# Patient Record
Sex: FEMALE | Race: WHITE | Employment: OTHER | URBAN - METROPOLITAN AREA
[De-identification: names, ages, dates, MRNs, and addresses within clinical notes are randomized per-mention and may not be internally consistent; named-entity substitution may affect disease eponyms.]

---

## 2017-01-12 ENCOUNTER — OFFICE VISIT (OUTPATIENT)
Dept: GERIATRIC MEDICINE | Age: 82
End: 2017-01-12

## 2017-01-12 DIAGNOSIS — R53.1 WEAKNESS: ICD-10-CM

## 2017-01-12 DIAGNOSIS — K59.00 CONSTIPATION, UNSPECIFIED CONSTIPATION TYPE: ICD-10-CM

## 2017-01-12 DIAGNOSIS — I10 ESSENTIAL HYPERTENSION: Primary | ICD-10-CM

## 2017-01-12 DIAGNOSIS — M15.9 OSTEOARTHRITIS OF MULTIPLE JOINTS, UNSPECIFIED OSTEOARTHRITIS TYPE: ICD-10-CM

## 2017-01-13 ENCOUNTER — OFFICE VISIT (OUTPATIENT)
Dept: FAMILY MEDICINE CLINIC | Age: 82
End: 2017-01-13

## 2017-01-13 VITALS
WEIGHT: 146 LBS | HEART RATE: 72 BPM | TEMPERATURE: 98.1 F | BODY MASS INDEX: 24.32 KG/M2 | RESPIRATION RATE: 14 BRPM | DIASTOLIC BLOOD PRESSURE: 62 MMHG | HEIGHT: 65 IN | SYSTOLIC BLOOD PRESSURE: 100 MMHG

## 2017-01-13 DIAGNOSIS — I87.2 VENOUS INSUFFICIENCY: ICD-10-CM

## 2017-01-13 DIAGNOSIS — I10 ESSENTIAL HYPERTENSION: ICD-10-CM

## 2017-01-13 DIAGNOSIS — Z23 NEED FOR PNEUMOCOCCAL VACCINATION: ICD-10-CM

## 2017-01-13 DIAGNOSIS — D69.6 THROMBOCYTOPENIA (HCC): ICD-10-CM

## 2017-01-13 DIAGNOSIS — M81.0 OSTEOPOROSIS: ICD-10-CM

## 2017-01-13 DIAGNOSIS — I10 ESSENTIAL HYPERTENSION: Primary | ICD-10-CM

## 2017-01-13 DIAGNOSIS — R73.9 HYPERGLYCEMIA: ICD-10-CM

## 2017-01-13 DIAGNOSIS — R32 INCONTINENCE IN FEMALE: ICD-10-CM

## 2017-01-13 DIAGNOSIS — Z23 NEED FOR INFLUENZA VACCINATION: ICD-10-CM

## 2017-01-13 DIAGNOSIS — G62.9 PERIPHERAL POLYNEUROPATHY: ICD-10-CM

## 2017-01-13 DIAGNOSIS — Z91.81 AT HIGH RISK FOR FALLS: ICD-10-CM

## 2017-01-13 LAB
ALBUMIN SERPL-MCNC: 3.7 G/DL (ref 3.9–4.9)
ALP BLD-CCNC: 123 U/L (ref 40–130)
ALT SERPL-CCNC: 12 U/L (ref 0–33)
ANION GAP SERPL CALCULATED.3IONS-SCNC: 16 MEQ/L (ref 7–13)
AST SERPL-CCNC: 20 U/L (ref 0–35)
BILIRUB SERPL-MCNC: 0.5 MG/DL (ref 0–1.2)
BUN BLDV-MCNC: 25 MG/DL (ref 8–23)
CALCIUM SERPL-MCNC: 9.7 MG/DL (ref 8.6–10.2)
CHLORIDE BLD-SCNC: 100 MEQ/L (ref 98–107)
CHOLESTEROL, TOTAL: 146 MG/DL (ref 0–199)
CO2: 22 MEQ/L (ref 22–29)
CREAT SERPL-MCNC: 0.79 MG/DL (ref 0.5–0.9)
GFR AFRICAN AMERICAN: >60
GFR NON-AFRICAN AMERICAN: >60
GLOBULIN: 4.6 G/DL (ref 2.3–3.5)
GLUCOSE BLD-MCNC: 83 MG/DL (ref 74–109)
HBA1C MFR BLD: 5.8 % (ref 4.8–5.9)
HCT VFR BLD CALC: 39.5 % (ref 37–47)
HDLC SERPL-MCNC: 62 MG/DL (ref 40–59)
HEMOGLOBIN: 12.7 G/DL (ref 12–16)
LDL CHOLESTEROL CALCULATED: 71 MG/DL (ref 0–129)
MCH RBC QN AUTO: 30.4 PG (ref 27–31.3)
MCHC RBC AUTO-ENTMCNC: 32.2 % (ref 33–37)
MCV RBC AUTO: 94.3 FL (ref 82–100)
PDW BLD-RTO: 17.4 % (ref 11.5–14.5)
PLATELET # BLD: 136 K/UL (ref 130–400)
POTASSIUM SERPL-SCNC: 4.7 MEQ/L (ref 3.5–5.1)
RBC # BLD: 4.19 M/UL (ref 4.2–5.4)
SEDIMENTATION RATE, ERYTHROCYTE: 51 MM (ref 0–30)
SODIUM BLD-SCNC: 138 MEQ/L (ref 132–144)
T4 FREE: 1.17 NG/DL (ref 0.93–1.7)
TOTAL PROTEIN: 8.3 G/DL (ref 6.4–8.1)
TRIGL SERPL-MCNC: 65 MG/DL (ref 0–200)
TSH SERPL DL<=0.05 MIU/L-ACNC: 4.11 UIU/ML (ref 0.27–4.2)
WBC # BLD: 5.5 K/UL (ref 4.8–10.8)

## 2017-01-13 PROCEDURE — G0009 ADMIN PNEUMOCOCCAL VACCINE: HCPCS | Performed by: FAMILY MEDICINE

## 2017-01-13 PROCEDURE — 90662 IIV NO PRSV INCREASED AG IM: CPT | Performed by: FAMILY MEDICINE

## 2017-01-13 PROCEDURE — 99203 OFFICE O/P NEW LOW 30 MIN: CPT | Performed by: FAMILY MEDICINE

## 2017-01-13 PROCEDURE — 90670 PCV13 VACCINE IM: CPT | Performed by: FAMILY MEDICINE

## 2017-01-13 PROCEDURE — G0008 ADMIN INFLUENZA VIRUS VAC: HCPCS | Performed by: FAMILY MEDICINE

## 2017-01-13 RX ORDER — POLYETHYLENE GLYCOL 3350 17 G/17G
POWDER, FOR SOLUTION ORAL
COMMUNITY
Start: 2016-12-22 | End: 2017-04-14 | Stop reason: SDUPTHER

## 2017-01-13 RX ORDER — DOXYCYCLINE 100 MG/1
TABLET ORAL
COMMUNITY
Start: 2016-11-28 | End: 2017-03-02 | Stop reason: SDUPTHER

## 2017-01-13 RX ORDER — TRAMADOL HYDROCHLORIDE 50 MG/1
50 TABLET ORAL EVERY 8 HOURS PRN
COMMUNITY
End: 2017-07-14

## 2017-01-13 ASSESSMENT — PATIENT HEALTH QUESTIONNAIRE - PHQ9
1. LITTLE INTEREST OR PLEASURE IN DOING THINGS: 0
SUM OF ALL RESPONSES TO PHQ QUESTIONS 1-9: 0
2. FEELING DOWN, DEPRESSED OR HOPELESS: 0
SUM OF ALL RESPONSES TO PHQ9 QUESTIONS 1 & 2: 0

## 2017-01-16 VITALS — SYSTOLIC BLOOD PRESSURE: 111 MMHG | HEART RATE: 65 BPM | DIASTOLIC BLOOD PRESSURE: 62 MMHG

## 2017-01-30 ENCOUNTER — NURSE ONLY (OUTPATIENT)
Dept: FAMILY MEDICINE CLINIC | Age: 82
End: 2017-01-30

## 2017-01-30 ENCOUNTER — OFFICE VISIT (OUTPATIENT)
Dept: INFECTIOUS DISEASES | Age: 82
End: 2017-01-30

## 2017-01-30 VITALS
BODY MASS INDEX: 24.19 KG/M2 | RESPIRATION RATE: 16 BRPM | DIASTOLIC BLOOD PRESSURE: 81 MMHG | HEIGHT: 65 IN | TEMPERATURE: 97.5 F | HEART RATE: 75 BPM | SYSTOLIC BLOOD PRESSURE: 143 MMHG | WEIGHT: 145.2 LBS

## 2017-01-30 DIAGNOSIS — T84.59XD INFECTED PROSTHETIC KNEE JOINT, SUBSEQUENT ENCOUNTER: Primary | ICD-10-CM

## 2017-01-30 DIAGNOSIS — L08.9 RIGHT KNEE SKIN INFECTION: ICD-10-CM

## 2017-01-30 DIAGNOSIS — R32 INCONTINENCE: Primary | ICD-10-CM

## 2017-01-30 DIAGNOSIS — R32 INCONTINENCE: ICD-10-CM

## 2017-01-30 DIAGNOSIS — Z96.659 INFECTED PROSTHETIC KNEE JOINT, SUBSEQUENT ENCOUNTER: Primary | ICD-10-CM

## 2017-01-30 LAB
BACTERIA: ABNORMAL /HPF
BILIRUBIN URINE: NEGATIVE
BLOOD, URINE: ABNORMAL
CLARITY: ABNORMAL
COLOR: YELLOW
EPITHELIAL CELLS, UA: ABNORMAL /HPF
GLUCOSE URINE: NEGATIVE MG/DL
KETONES, URINE: NEGATIVE MG/DL
LEUKOCYTE ESTERASE, URINE: ABNORMAL
NITRITE, URINE: POSITIVE
PH UA: 6 (ref 5–9)
PROTEIN UA: NEGATIVE MG/DL
RBC UA: ABNORMAL /HPF (ref 0–2)
SPECIFIC GRAVITY UA: 1.02 (ref 1–1.03)
UROBILINOGEN, URINE: 0.2 E.U./DL
WBC UA: ABNORMAL /HPF (ref 0–5)

## 2017-01-30 PROCEDURE — 99213 OFFICE O/P EST LOW 20 MIN: CPT | Performed by: INTERNAL MEDICINE

## 2017-01-30 RX ORDER — DOXYCYCLINE HYCLATE 100 MG
100 TABLET ORAL DAILY
Qty: 30 TABLET | Refills: 3 | Status: SHIPPED | OUTPATIENT
Start: 2017-01-30 | End: 2017-03-01

## 2017-01-30 RX ORDER — DOXYCYCLINE HYCLATE 100 MG
100 TABLET ORAL DAILY
Qty: 30 TABLET | Refills: 3 | Status: SHIPPED | OUTPATIENT
Start: 2017-01-30 | End: 2017-01-30

## 2017-01-30 ASSESSMENT — ENCOUNTER SYMPTOMS
VOMITING: 0
EYE PAIN: 0
DIARRHEA: 0
COUGH: 0

## 2017-02-02 ENCOUNTER — TELEPHONE (OUTPATIENT)
Dept: FAMILY MEDICINE CLINIC | Age: 82
End: 2017-02-02

## 2017-02-02 LAB
ORGANISM: ABNORMAL
URINE CULTURE, ROUTINE: ABNORMAL

## 2017-02-03 RX ORDER — CIPROFLOXACIN 500 MG/1
500 TABLET, FILM COATED ORAL 2 TIMES DAILY
Qty: 14 TABLET | Refills: 0 | Status: SHIPPED | OUTPATIENT
Start: 2017-02-03 | End: 2017-02-10

## 2017-03-02 ENCOUNTER — OFFICE VISIT (OUTPATIENT)
Dept: INFECTIOUS DISEASES | Age: 82
End: 2017-03-02

## 2017-03-02 ENCOUNTER — NURSE ONLY (OUTPATIENT)
Dept: FAMILY MEDICINE CLINIC | Age: 82
End: 2017-03-02

## 2017-03-02 VITALS
RESPIRATION RATE: 18 BRPM | SYSTOLIC BLOOD PRESSURE: 126 MMHG | HEIGHT: 65 IN | HEART RATE: 65 BPM | DIASTOLIC BLOOD PRESSURE: 61 MMHG | TEMPERATURE: 97.8 F

## 2017-03-02 DIAGNOSIS — N39.0 URINARY TRACT INFECTION WITH HEMATURIA, SITE UNSPECIFIED: Primary | ICD-10-CM

## 2017-03-02 DIAGNOSIS — R31.9 URINARY TRACT INFECTION WITH HEMATURIA, SITE UNSPECIFIED: ICD-10-CM

## 2017-03-02 DIAGNOSIS — N39.0 URINARY TRACT INFECTION WITH HEMATURIA, SITE UNSPECIFIED: ICD-10-CM

## 2017-03-02 DIAGNOSIS — L03.90 CELLULITIS, UNSPECIFIED CELLULITIS SITE: Primary | ICD-10-CM

## 2017-03-02 DIAGNOSIS — R31.9 URINARY TRACT INFECTION WITH HEMATURIA, SITE UNSPECIFIED: Primary | ICD-10-CM

## 2017-03-02 PROCEDURE — 99213 OFFICE O/P EST LOW 20 MIN: CPT | Performed by: INTERNAL MEDICINE

## 2017-03-02 RX ORDER — DOXYCYCLINE 100 MG/1
100 TABLET ORAL DAILY
Qty: 30 TABLET | Refills: 3 | Status: SHIPPED | OUTPATIENT
Start: 2017-03-02 | End: 2017-03-27 | Stop reason: SDUPTHER

## 2017-03-02 ASSESSMENT — ENCOUNTER SYMPTOMS
VOMITING: 0
DIARRHEA: 0
COUGH: 0
EYE PAIN: 0

## 2017-03-04 LAB — URINE CULTURE, ROUTINE: NORMAL

## 2017-03-27 RX ORDER — DOCUSATE SODIUM 100 MG/1
100 CAPSULE, LIQUID FILLED ORAL DAILY
Qty: 9 CAPSULE | Refills: 3 | Status: SHIPPED | OUTPATIENT
Start: 2017-03-27 | End: 2017-10-27 | Stop reason: SDUPTHER

## 2017-03-27 RX ORDER — GREEN TEA/HOODIA GORDONII 315-12.5MG
1 CAPSULE ORAL 3 TIMES DAILY
Qty: 60 TABLET | Refills: 1 | Status: SHIPPED | OUTPATIENT
Start: 2017-03-27 | End: 2017-04-14 | Stop reason: SDUPTHER

## 2017-03-27 RX ORDER — SPIRONOLACTONE 25 MG/1
25 TABLET ORAL DAILY
Qty: 30 TABLET | Refills: 5 | Status: SHIPPED | OUTPATIENT
Start: 2017-03-27 | End: 2017-04-14 | Stop reason: SDUPTHER

## 2017-03-27 RX ORDER — DOXYCYCLINE 100 MG/1
100 TABLET ORAL DAILY
Qty: 30 TABLET | Refills: 3 | Status: SHIPPED | OUTPATIENT
Start: 2017-03-27 | End: 2017-04-14 | Stop reason: SDUPTHER

## 2017-03-27 RX ORDER — CHOLECALCIFEROL (VITAMIN D3) 125 MCG
1 CAPSULE ORAL NIGHTLY
Qty: 90 TABLET | Refills: 3 | Status: SHIPPED | OUTPATIENT
Start: 2017-03-27 | End: 2017-10-27 | Stop reason: SDUPTHER

## 2017-04-14 RX ORDER — GREEN TEA/HOODIA GORDONII 315-12.5MG
1 CAPSULE ORAL 3 TIMES DAILY
Qty: 60 TABLET | Refills: 1 | Status: SHIPPED | OUTPATIENT
Start: 2017-04-14 | End: 2017-04-26 | Stop reason: SDUPTHER

## 2017-04-14 RX ORDER — SPIRONOLACTONE 25 MG/1
25 TABLET ORAL DAILY
Qty: 30 TABLET | Refills: 5 | Status: SHIPPED | OUTPATIENT
Start: 2017-04-14 | End: 2017-04-26 | Stop reason: SDUPTHER

## 2017-04-14 RX ORDER — DOXYCYCLINE 100 MG/1
100 TABLET ORAL DAILY
Qty: 30 TABLET | Refills: 3 | Status: SHIPPED | OUTPATIENT
Start: 2017-04-14 | End: 2017-04-26 | Stop reason: SDUPTHER

## 2017-04-17 RX ORDER — POLYETHYLENE GLYCOL 3350 17 G/17G
17 POWDER, FOR SOLUTION ORAL DAILY
Qty: 1 BOTTLE | Refills: 1 | Status: SHIPPED | OUTPATIENT
Start: 2017-04-17 | End: 2017-10-27 | Stop reason: SDUPTHER

## 2017-04-28 RX ORDER — SPIRONOLACTONE 25 MG/1
25 TABLET ORAL DAILY
Qty: 30 TABLET | Refills: 0 | Status: SHIPPED | OUTPATIENT
Start: 2017-04-28 | End: 2017-08-15 | Stop reason: SDUPTHER

## 2017-04-28 RX ORDER — GREEN TEA/HOODIA GORDONII 315-12.5MG
1 CAPSULE ORAL 3 TIMES DAILY
Qty: 90 TABLET | Refills: 0 | Status: SHIPPED | OUTPATIENT
Start: 2017-04-28 | End: 2017-10-27 | Stop reason: SDUPTHER

## 2017-04-28 RX ORDER — DOXYCYCLINE 100 MG/1
100 TABLET ORAL DAILY
Qty: 30 TABLET | Refills: 0 | Status: SHIPPED | OUTPATIENT
Start: 2017-04-28 | End: 2017-08-15 | Stop reason: SDUPTHER

## 2017-05-16 ENCOUNTER — OFFICE VISIT (OUTPATIENT)
Dept: INFECTIOUS DISEASES | Age: 82
End: 2017-05-16

## 2017-05-16 VITALS
HEART RATE: 62 BPM | RESPIRATION RATE: 14 BRPM | TEMPERATURE: 98.5 F | BODY MASS INDEX: 24.59 KG/M2 | WEIGHT: 147.6 LBS | SYSTOLIC BLOOD PRESSURE: 143 MMHG | HEIGHT: 65 IN | DIASTOLIC BLOOD PRESSURE: 59 MMHG

## 2017-05-16 DIAGNOSIS — T84.59XD INFECTED PROSTHETIC KNEE JOINT, SUBSEQUENT ENCOUNTER: Primary | ICD-10-CM

## 2017-05-16 DIAGNOSIS — Z96.659 INFECTED PROSTHETIC KNEE JOINT, SUBSEQUENT ENCOUNTER: Primary | ICD-10-CM

## 2017-05-16 PROCEDURE — 99212 OFFICE O/P EST SF 10 MIN: CPT | Performed by: INTERNAL MEDICINE

## 2017-05-16 RX ORDER — DOXYCYCLINE HYCLATE 100 MG
100 TABLET ORAL 2 TIMES DAILY
Qty: 180 TABLET | Refills: 2 | Status: SHIPPED | OUTPATIENT
Start: 2017-05-16 | End: 2017-07-14

## 2017-05-16 ASSESSMENT — ENCOUNTER SYMPTOMS: RESPIRATORY NEGATIVE: 1

## 2017-07-14 ENCOUNTER — OFFICE VISIT (OUTPATIENT)
Dept: FAMILY MEDICINE CLINIC | Age: 82
End: 2017-07-14

## 2017-07-14 VITALS
SYSTOLIC BLOOD PRESSURE: 114 MMHG | TEMPERATURE: 98.6 F | HEART RATE: 74 BPM | WEIGHT: 147 LBS | DIASTOLIC BLOOD PRESSURE: 68 MMHG | RESPIRATION RATE: 14 BRPM | HEIGHT: 65 IN | BODY MASS INDEX: 24.49 KG/M2

## 2017-07-14 DIAGNOSIS — I10 ESSENTIAL HYPERTENSION: Primary | ICD-10-CM

## 2017-07-14 DIAGNOSIS — R53.1 WEAKNESS: ICD-10-CM

## 2017-07-14 DIAGNOSIS — D69.6 THROMBOCYTOPENIA (HCC): ICD-10-CM

## 2017-07-14 DIAGNOSIS — M81.0 OSTEOPOROSIS: ICD-10-CM

## 2017-07-14 DIAGNOSIS — I10 ESSENTIAL HYPERTENSION: ICD-10-CM

## 2017-07-14 DIAGNOSIS — G62.9 PERIPHERAL POLYNEUROPATHY: ICD-10-CM

## 2017-07-14 LAB
ANION GAP SERPL CALCULATED.3IONS-SCNC: 12 MEQ/L (ref 7–13)
BUN BLDV-MCNC: 23 MG/DL (ref 8–23)
CALCIUM SERPL-MCNC: 9.1 MG/DL (ref 8.6–10.2)
CHLORIDE BLD-SCNC: 104 MEQ/L (ref 98–107)
CO2: 23 MEQ/L (ref 22–29)
CREAT SERPL-MCNC: 0.62 MG/DL (ref 0.5–0.9)
GFR AFRICAN AMERICAN: >60
GFR NON-AFRICAN AMERICAN: >60
GLUCOSE BLD-MCNC: 99 MG/DL (ref 74–109)
HCT VFR BLD CALC: 39.2 % (ref 37–47)
HEMOGLOBIN: 12.9 G/DL (ref 12–16)
MCH RBC QN AUTO: 32.4 PG (ref 27–31.3)
MCHC RBC AUTO-ENTMCNC: 32.9 % (ref 33–37)
MCV RBC AUTO: 98.3 FL (ref 82–100)
PDW BLD-RTO: 13.5 % (ref 11.5–14.5)
PLATELET # BLD: 109 K/UL (ref 130–400)
POTASSIUM SERPL-SCNC: 4.7 MEQ/L (ref 3.5–5.1)
RBC # BLD: 3.99 M/UL (ref 4.2–5.4)
SODIUM BLD-SCNC: 139 MEQ/L (ref 132–144)
WBC # BLD: 3.5 K/UL (ref 4.8–10.8)

## 2017-07-14 PROCEDURE — 99214 OFFICE O/P EST MOD 30 MIN: CPT | Performed by: FAMILY MEDICINE

## 2017-07-17 ENCOUNTER — TELEPHONE (OUTPATIENT)
Dept: FAMILY MEDICINE CLINIC | Age: 82
End: 2017-07-17

## 2017-07-17 DIAGNOSIS — R27.0 ATAXIA: ICD-10-CM

## 2017-07-17 DIAGNOSIS — M47.26 OSTEOARTHRITIS OF SPINE WITH RADICULOPATHY, LUMBAR REGION: Primary | ICD-10-CM

## 2017-08-15 RX ORDER — SPIRONOLACTONE 25 MG/1
25 TABLET ORAL DAILY
Qty: 90 TABLET | Refills: 1 | Status: SHIPPED | OUTPATIENT
Start: 2017-08-15 | End: 2017-10-27 | Stop reason: SDUPTHER

## 2017-08-15 RX ORDER — DOXYCYCLINE 100 MG/1
100 TABLET ORAL DAILY
Qty: 90 TABLET | Refills: 1 | Status: SHIPPED | OUTPATIENT
Start: 2017-08-15 | End: 2017-10-27 | Stop reason: SDUPTHER

## 2017-10-22 ENCOUNTER — APPOINTMENT (OUTPATIENT)
Dept: GENERAL RADIOLOGY | Age: 82
DRG: 481 | End: 2017-10-22
Payer: MEDICARE

## 2017-10-22 ENCOUNTER — HOSPITAL ENCOUNTER (INPATIENT)
Age: 82
LOS: 4 days | Discharge: NURSING, REHAB OR LONG-TERM CARE | DRG: 481 | End: 2017-10-26
Attending: ORTHOPAEDIC SURGERY | Admitting: ORTHOPAEDIC SURGERY
Payer: MEDICARE

## 2017-10-22 ENCOUNTER — APPOINTMENT (OUTPATIENT)
Dept: CT IMAGING | Age: 82
DRG: 481 | End: 2017-10-22
Payer: MEDICARE

## 2017-10-22 DIAGNOSIS — S61.212A LACERATION OF RIGHT MIDDLE FINGER WITHOUT DAMAGE TO NAIL, FOREIGN BODY PRESENCE UNSPECIFIED, INITIAL ENCOUNTER: ICD-10-CM

## 2017-10-22 DIAGNOSIS — S72.001A CLOSED FRACTURE OF RIGHT HIP, INITIAL ENCOUNTER (HCC): Primary | ICD-10-CM
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
## 2017-10-22 LAB
ALBUMIN SERPL-MCNC: 3.7 G/DL (ref 3.9–4.9)
ALP BLD-CCNC: 106 U/L (ref 40–130)
ALT SERPL-CCNC: 14 U/L (ref 0–33)
ANION GAP SERPL CALCULATED.3IONS-SCNC: 14 MEQ/L (ref 7–13)
AST SERPL-CCNC: 23 U/L (ref 0–35)
BACTERIA: ABNORMAL /HPF
BASOPHILS ABSOLUTE: 0 K/UL (ref 0–0.2)
BASOPHILS RELATIVE PERCENT: 0.7 %
BILIRUB SERPL-MCNC: 0.6 MG/DL (ref 0–1.2)
BILIRUBIN URINE: NEGATIVE
BLOOD, URINE: ABNORMAL
BUN BLDV-MCNC: 26 MG/DL (ref 8–23)
CALCIUM SERPL-MCNC: 9.6 MG/DL (ref 8.6–10.2)
CHLORIDE BLD-SCNC: 101 MEQ/L (ref 98–107)
CLARITY: CLEAR
CO2: 24 MEQ/L (ref 22–29)
COLOR: YELLOW
CREAT SERPL-MCNC: 0.67 MG/DL (ref 0.5–0.9)
EOSINOPHILS ABSOLUTE: 0.1 K/UL (ref 0–0.7)
EOSINOPHILS RELATIVE PERCENT: 1.3 %
EPITHELIAL CELLS, UA: ABNORMAL /HPF
GFR AFRICAN AMERICAN: >60
GFR NON-AFRICAN AMERICAN: >60
GLOBULIN: 4 G/DL (ref 2.3–3.5)
GLUCOSE BLD-MCNC: 112 MG/DL (ref 74–109)
GLUCOSE URINE: NEGATIVE MG/DL
HCT VFR BLD CALC: 38.9 % (ref 37–47)
HEMOGLOBIN: 13 G/DL (ref 12–16)
KETONES, URINE: NEGATIVE MG/DL
LEUKOCYTE ESTERASE, URINE: NEGATIVE
LYMPHOCYTES ABSOLUTE: 2.1 K/UL (ref 1–4.8)
LYMPHOCYTES RELATIVE PERCENT: 42.2 %
MCH RBC QN AUTO: 32.7 PG (ref 27–31.3)
MCHC RBC AUTO-ENTMCNC: 33.5 % (ref 33–37)
MCV RBC AUTO: 97.7 FL (ref 82–100)
MONOCYTES ABSOLUTE: 0.5 K/UL (ref 0.2–0.8)
MONOCYTES RELATIVE PERCENT: 11.1 %
NEUTROPHILS ABSOLUTE: 2.2 K/UL (ref 1.4–6.5)
NEUTROPHILS RELATIVE PERCENT: 44.7 %
NITRITE, URINE: NEGATIVE
PDW BLD-RTO: 13.3 % (ref 11.5–14.5)
PH UA: 6 (ref 5–9)
PLATELET # BLD: 92 K/UL (ref 130–400)
PLATELET SLIDE REVIEW: ABNORMAL
POTASSIUM SERPL-SCNC: 4.3 MEQ/L (ref 3.5–5.1)
PROTEIN UA: NEGATIVE MG/DL
RBC # BLD: 3.98 M/UL (ref 4.2–5.4)
RBC UA: ABNORMAL /HPF (ref 0–2)
SODIUM BLD-SCNC: 139 MEQ/L (ref 132–144)
SPECIFIC GRAVITY UA: 1.01 (ref 1–1.03)
TOTAL PROTEIN: 7.7 G/DL (ref 6.4–8.1)
URINE REFLEX TO CULTURE: YES
UROBILINOGEN, URINE: 0.2 E.U./DL
WBC # BLD: 4.9 K/UL (ref 4.8–10.8)
WBC UA: ABNORMAL /HPF (ref 0–5)

## 2017-10-22 PROCEDURE — 99285 EMERGENCY DEPT VISIT HI MDM: CPT

## 2017-10-22 PROCEDURE — 72125 CT NECK SPINE W/O DYE: CPT

## 2017-10-22 PROCEDURE — 1210000000 HC MED SURG R&B

## 2017-10-22 PROCEDURE — 96374 THER/PROPH/DIAG INJ IV PUSH: CPT

## 2017-10-22 PROCEDURE — 6360000002 HC RX W HCPCS: Performed by: PHYSICIAN ASSISTANT

## 2017-10-22 PROCEDURE — 73552 X-RAY EXAM OF FEMUR 2/>: CPT

## 2017-10-22 PROCEDURE — 85025 COMPLETE CBC W/AUTO DIFF WBC: CPT

## 2017-10-22 PROCEDURE — 96375 TX/PRO/DX INJ NEW DRUG ADDON: CPT

## 2017-10-22 PROCEDURE — 87086 URINE CULTURE/COLONY COUNT: CPT

## 2017-10-22 PROCEDURE — 6370000000 HC RX 637 (ALT 250 FOR IP): Performed by: ORTHOPAEDIC SURGERY

## 2017-10-22 PROCEDURE — 99222 1ST HOSP IP/OBS MODERATE 55: CPT | Performed by: INTERNAL MEDICINE

## 2017-10-22 PROCEDURE — 71010 XR CHEST LIMITED: CPT

## 2017-10-22 PROCEDURE — 73502 X-RAY EXAM HIP UNI 2-3 VIEWS: CPT

## 2017-10-22 PROCEDURE — 93005 ELECTROCARDIOGRAM TRACING: CPT

## 2017-10-22 PROCEDURE — 70450 CT HEAD/BRAIN W/O DYE: CPT

## 2017-10-22 PROCEDURE — 6360000002 HC RX W HCPCS: Performed by: ORTHOPAEDIC SURGERY

## 2017-10-22 PROCEDURE — 80053 COMPREHEN METABOLIC PANEL: CPT

## 2017-10-22 PROCEDURE — 6370000000 HC RX 637 (ALT 250 FOR IP): Performed by: PHYSICIAN ASSISTANT

## 2017-10-22 PROCEDURE — 71010 XR CHEST PORTABLE: CPT

## 2017-10-22 PROCEDURE — 2500000003 HC RX 250 WO HCPCS: Performed by: PHYSICIAN ASSISTANT

## 2017-10-22 PROCEDURE — 36415 COLL VENOUS BLD VENIPUNCTURE: CPT

## 2017-10-22 PROCEDURE — 0HQFXZZ REPAIR RIGHT HAND SKIN, EXTERNAL APPROACH: ICD-10-PCS | Performed by: PHYSICIAN ASSISTANT

## 2017-10-22 PROCEDURE — 81001 URINALYSIS AUTO W/SCOPE: CPT

## 2017-10-22 RX ORDER — ONDANSETRON 2 MG/ML
4 INJECTION INTRAMUSCULAR; INTRAVENOUS EVERY 6 HOURS PRN
Status: DISCONTINUED | OUTPATIENT
Start: 2017-10-22 | End: 2017-10-24

## 2017-10-22 RX ORDER — DOCUSATE SODIUM 100 MG/1
100 CAPSULE, LIQUID FILLED ORAL DAILY
Status: DISCONTINUED | OUTPATIENT
Start: 2017-10-22 | End: 2017-10-26 | Stop reason: HOSPADM

## 2017-10-22 RX ORDER — L. ACIDOPHILUS/L.BULGARICUS 1MM CELL
1 TABLET ORAL 3 TIMES DAILY
Status: DISCONTINUED | OUTPATIENT
Start: 2017-10-22 | End: 2017-10-26 | Stop reason: HOSPADM

## 2017-10-22 RX ORDER — MORPHINE SULFATE 4 MG/ML
4 INJECTION, SOLUTION INTRAMUSCULAR; INTRAVENOUS
Status: DISCONTINUED | OUTPATIENT
Start: 2017-10-22 | End: 2017-10-24

## 2017-10-22 RX ORDER — SODIUM CHLORIDE 0.9 % (FLUSH) 0.9 %
3 SYRINGE (ML) INJECTION EVERY 8 HOURS
Status: DISCONTINUED | OUTPATIENT
Start: 2017-10-22 | End: 2017-10-26 | Stop reason: HOSPADM

## 2017-10-22 RX ORDER — MORPHINE SULFATE 2 MG/ML
2 INJECTION, SOLUTION INTRAMUSCULAR; INTRAVENOUS ONCE
Status: COMPLETED | OUTPATIENT
Start: 2017-10-22 | End: 2017-10-22

## 2017-10-22 RX ORDER — ONDANSETRON 2 MG/ML
4 INJECTION INTRAMUSCULAR; INTRAVENOUS ONCE
Status: COMPLETED | OUTPATIENT
Start: 2017-10-22 | End: 2017-10-22

## 2017-10-22 RX ORDER — SPIRONOLACTONE 25 MG/1
25 TABLET ORAL DAILY
Status: DISCONTINUED | OUTPATIENT
Start: 2017-10-22 | End: 2017-10-26 | Stop reason: HOSPADM

## 2017-10-22 RX ORDER — CYCLOBENZAPRINE HCL 10 MG
5 TABLET ORAL 2 TIMES DAILY PRN
Status: DISCONTINUED | OUTPATIENT
Start: 2017-10-22 | End: 2017-10-26 | Stop reason: HOSPADM

## 2017-10-22 RX ORDER — DOXYCYCLINE HYCLATE 100 MG/1
100 CAPSULE ORAL DAILY
Status: DISCONTINUED | OUTPATIENT
Start: 2017-10-22 | End: 2017-10-26 | Stop reason: HOSPADM

## 2017-10-22 RX ORDER — OXYCODONE HYDROCHLORIDE AND ACETAMINOPHEN 5; 325 MG/1; MG/1
2 TABLET ORAL EVERY 4 HOURS PRN
Status: DISCONTINUED | OUTPATIENT
Start: 2017-10-22 | End: 2017-10-24

## 2017-10-22 RX ORDER — DOXYCYCLINE 100 MG/1
100 TABLET ORAL DAILY
Status: DISCONTINUED | OUTPATIENT
Start: 2017-10-22 | End: 2017-10-22

## 2017-10-22 RX ORDER — LORAZEPAM 2 MG/ML
0.5 INJECTION INTRAMUSCULAR ONCE
Status: COMPLETED | OUTPATIENT
Start: 2017-10-22 | End: 2017-10-22

## 2017-10-22 RX ORDER — LIDOCAINE HYDROCHLORIDE 10 MG/ML
5 INJECTION, SOLUTION EPIDURAL; INFILTRATION; INTRACAUDAL; PERINEURAL ONCE
Status: COMPLETED | OUTPATIENT
Start: 2017-10-22 | End: 2017-10-22

## 2017-10-22 RX ORDER — UREA 10 %
5 LOTION (ML) TOPICAL NIGHTLY
Status: DISCONTINUED | OUTPATIENT
Start: 2017-10-22 | End: 2017-10-26 | Stop reason: HOSPADM

## 2017-10-22 RX ORDER — OXYCODONE HYDROCHLORIDE AND ACETAMINOPHEN 5; 325 MG/1; MG/1
1 TABLET ORAL EVERY 4 HOURS PRN
Status: DISCONTINUED | OUTPATIENT
Start: 2017-10-22 | End: 2017-10-24

## 2017-10-22 RX ORDER — MORPHINE SULFATE 2 MG/ML
2 INJECTION, SOLUTION INTRAMUSCULAR; INTRAVENOUS
Status: DISCONTINUED | OUTPATIENT
Start: 2017-10-22 | End: 2017-10-24

## 2017-10-22 RX ORDER — POLYETHYLENE GLYCOL 3350 17 G/17G
17 POWDER, FOR SOLUTION ORAL DAILY
Status: DISCONTINUED | OUTPATIENT
Start: 2017-10-22 | End: 2017-10-26 | Stop reason: HOSPADM

## 2017-10-22 RX ORDER — GINSENG 100 MG
CAPSULE ORAL ONCE
Status: COMPLETED | OUTPATIENT
Start: 2017-10-22 | End: 2017-10-22

## 2017-10-22 RX ADMIN — CYCLOBENZAPRINE HYDROCHLORIDE 5 MG: 10 TABLET, FILM COATED ORAL at 11:27

## 2017-10-22 RX ADMIN — OXYCODONE HYDROCHLORIDE AND ACETAMINOPHEN 2 TABLET: 5; 325 TABLET ORAL at 20:53

## 2017-10-22 RX ADMIN — ONDANSETRON 4 MG: 2 INJECTION INTRAMUSCULAR; INTRAVENOUS at 04:49

## 2017-10-22 RX ADMIN — Medication 5 MG: at 20:53

## 2017-10-22 RX ADMIN — BACITRACIN: 500 OINTMENT TOPICAL at 05:51

## 2017-10-22 RX ADMIN — DOXYCYCLINE HYCLATE 100 MG: 100 CAPSULE, GELATIN COATED ORAL at 20:53

## 2017-10-22 RX ADMIN — DOCUSATE SODIUM 100 MG: 100 CAPSULE, LIQUID FILLED ORAL at 20:53

## 2017-10-22 RX ADMIN — Medication 2 MG: at 04:49

## 2017-10-22 RX ADMIN — POLYETHYLENE GLYCOL 3350 17 G: 17 POWDER, FOR SOLUTION ORAL at 20:52

## 2017-10-22 RX ADMIN — LORAZEPAM 0.5 MG: 2 INJECTION INTRAMUSCULAR; INTRAVENOUS at 05:51

## 2017-10-22 RX ADMIN — MORPHINE SULFATE 2 MG: 2 INJECTION, SOLUTION INTRAMUSCULAR; INTRAVENOUS at 15:09

## 2017-10-22 RX ADMIN — LACTOBACILLUS TAB 1 TABLET: TAB at 20:53

## 2017-10-22 RX ADMIN — LIDOCAINE HYDROCHLORIDE 5 ML: 10 INJECTION, SOLUTION EPIDURAL; INFILTRATION; INTRACAUDAL; PERINEURAL at 04:26

## 2017-10-22 RX ADMIN — SPIRONOLACTONE 25 MG: 25 TABLET, FILM COATED ORAL at 20:53

## 2017-10-22 ASSESSMENT — PAIN SCALES - GENERAL
PAINLEVEL_OUTOF10: 8
PAINLEVEL_OUTOF10: 5
PAINLEVEL_OUTOF10: 7
PAINLEVEL_OUTOF10: 6

## 2017-10-22 ASSESSMENT — ENCOUNTER SYMPTOMS
ANAL BLEEDING: 0
PHOTOPHOBIA: 0
ABDOMINAL DISTENTION: 0
COUGH: 0
BACK PAIN: 0
SHORTNESS OF BREATH: 0
VOICE CHANGE: 0
APNEA: 0
EYE DISCHARGE: 0

## 2017-10-22 ASSESSMENT — PAIN DESCRIPTION - PAIN TYPE: TYPE: ACUTE PAIN

## 2017-10-22 ASSESSMENT — PAIN DESCRIPTION - ORIENTATION: ORIENTATION: RIGHT

## 2017-10-22 ASSESSMENT — PAIN DESCRIPTION - LOCATION: LOCATION: HIP;KNEE

## 2017-10-22 ASSESSMENT — PAIN DESCRIPTION - DESCRIPTORS: DESCRIPTORS: DISCOMFORT

## 2017-10-22 NOTE — CARE COORDINATION
I met w pt in the room. She is from an independent apt in [place] and has only been there for a month. Prior to that she was at [facility], and prior to that she was at [facility] for 6 weeks for IV abx for an infection in her rt knee. She wants to return to [facility] for her rehab upon dc. Pt is being cleared for surgery tomorrow. C3 will follow.

## 2017-10-22 NOTE — ED PROVIDER NOTES
bruise/bleed easily. Psychiatric/Behavioral: Negative for behavioral problems, self-injury and sleep disturbance. All other systems reviewed and are negative. Except as noted above the remainder of the review of systems was reviewed and negative. PAST MEDICAL HISTORY     Past Medical History:   Diagnosis Date    Back pain     Constipation     Diverticulosis     DVT (deep venous thrombosis) (HCC)     H/O echocardiogram     EF normal    Hearing loss     Heart block AV third degree (HCC)     HTN (hypertension)     Osteoporosis     Pacemaker     Peripheral neuropathy ([address]     Pulmonary hypertension     Pulmonary nodule, left     Thrombocytopenia (HCC)     Tricuspid regurgitation     Tubular adenoma 2010         SURGICAL HISTORY       Past Surgical History:   Procedure Laterality Date    COLONOSCOPY  1990s    declines further-hx adenoma    HYSTERECTOMY  1965    NO CANCER    SHOULDER ARTHROPLASTY  1990s    right     TOTAL KNEE ARTHROPLASTY  2008    right         CURRENT MEDICATIONS       Previous Medications    DOCUSATE SODIUM (COLACE) 100 MG CAPSULE    Take 1 capsule by mouth daily    DOXYCYCLINE MONOHYDRATE (ADOXA) 100 MG TABLET    Take 1 tablet by mouth daily    LACTOBACILLUS (PROBIOTIC ACIDOPHILUS) TABS    Take 1 tablet by mouth 3 times daily    MAGNESIUM HYDROXIDE (MILK OF MAGNESIA PO)    Take by mouth    MELATONIN 5 MG TABS TABLET    Take 1 tablet by mouth nightly    NAPROXEN SOD-DIPHENHYDRAMINE (ALEVE PM PO)    Take by mouth    POLYETHYLENE GLYCOL (GLYCOLAX) POWDER    Take 17 g by mouth daily    SPIRONOLACTONE (ALDACTONE) 25 MG TABLET    Take 1 tablet by mouth daily       ALLERGIES     Bactrim [sulfamethoxazole-trimethoprim]    FAMILY HISTORY       Family History   Problem Relation Age of Onset    Cancer Mother     Other Father      heart attack.           SOCIAL HISTORY       Social History     Social History    Marital status:      Spouse name: N/A    Number of children: N/A    Years of education: N/A     Social History Main Topics    Smoking status: Former Smoker     Types: Cigarettes    Smokeless tobacco: Never Used    Alcohol use No    Drug use: No    Sexual activity: No     Other Topics Concern    None     Social History Narrative    None       SCREENINGS   NIH Stroke Scale  NIH Stroke Scale Assessed: NoGlasgow Coma Scale  Eye Opening: Spontaneous  Best Verbal Response: Oriented  Best Motor Response: Obeys commands  Napoleon Coma Scale Score: 15        PHYSICAL EXAM    (up to 7 for level 4, 8 or more for level 5)     ED Triage Vitals [10/22/17 0207]   BP Temp Temp Source Pulse Resp SpO2 Height Weight   (!) 147/93 97.5 °F (36.4 °C) Oral 75 -- 99 % -- 148 lb (67.1 kg)       Physical Exam   Constitutional: She is oriented to person, place, and time. She appears well-developed and well-nourished. No distress. HENT:   Head: Normocephalic and atraumatic. Eyes: Pupils are equal, round, and reactive to light. Right eye exhibits no discharge. Left eye exhibits no discharge. Neck: Normal range of motion. Neck supple. Cardiovascular: Normal rate, regular rhythm, normal heart sounds and intact distal pulses. Pulmonary/Chest: Effort normal and breath sounds normal. No stridor. No respiratory distress. She has no wheezes. She has no rales. Abdominal: Soft. Bowel sounds are normal. She exhibits no distension. Musculoskeletal: She exhibits tenderness and deformity. Positive right hip tenderness right leg externally rotated +1 anterior tibial pulse. Neurological: She is alert and oriented to person, place, and time. Skin: Skin is warm. No erythema. Right third digit flap laceration. Psychiatric: She has a normal mood and affect. Nursing note and vitals reviewed.       DIAGNOSTIC RESULTS     EKG: All EKG's are interpreted by the Emergency Department Physician who either signs or Co-signs this chart in the absence of a cardiologist.         RADIOLOGY: clearance today and surgery probably tomorrow. Amount and/or Complexity of Data Reviewed  Clinical lab tests: reviewed and ordered  Tests in the radiology section of CPT®: ordered and reviewed  Obtain history from someone other than the patient: yes  Discuss the patient with other providers: yes    Risk of Complications, Morbidity, and/or Mortality  Presenting problems: moderate  Diagnostic procedures: moderate  Management options: moderate    Critical Care  Total time providing critical care: 30-74 minutes      CRITICAL CARE TIME   Total Critical Care time was 31 minutes, excluding separately reportable procedures. There was a high probability of clinically significant/life threatening deterioration in the patient's condition which required my urgent intervention. CONSULTS:  IP CONSULT TO ORTHOPEDIC SURGERY    PROCEDURES:  Unless otherwise noted below, none     Lac Repair  Date/Time: 10/22/2017 5:36 AM  Performed by: Ann Bae by: Sriram Marrero     Consent:     Consent obtained:  Verbal    Consent given by:  Patient    Risks discussed:  Pain, poor cosmetic result and tendon damage  Anesthesia (see MAR for exact dosages):      Anesthesia method:  Local infiltration    Local anesthetic:  Lidocaine 1% w/o epi  Laceration details:     Location:  Finger    Finger location:  R long finger    Length (cm):  4 (Flap)    Depth (mm):  3 (flap)  Repair type:     Repair type:  Simple  Pre-procedure details:     Preparation:  Patient was prepped and draped in usual sterile fashion  Exploration:     Wound exploration: entire depth of wound probed and visualized    Treatment:     Area cleansed with:  Betadine    Irrigation solution:  Sterile saline    Irrigation method:  Syringe    Visualized foreign bodies/material removed: no    Skin repair:     Repair method:  Sutures    Suture size:  5-0    Suture material:  Prolene    Suture technique:  Simple interrupted    Number of sutures: 5  Approximation:     Approximation:  Loose  Post-procedure details:     Dressing:  Antibiotic ointment and non-adherent dressing    Patient tolerance of procedure: Tolerated well, no immediate complications  Comments:      sterileTechnique utilized        FINAL IMPRESSION      1. Closed fracture of right hip, initial encounter ([facility] [address]    2. Laceration of right middle finger without damage to nail, foreign body presence unspecified, initial encounter    3.  Fall, initial encounter          DISPOSITION/PLAN   DISPOSITION Admitted    PATIENT REFERRED TO:  Krzysztof Dugan MD  [facility] [phone]            DISCHARGE MEDICATIONS:  New Prescriptions    No medications on file          (Please note that portions of this note were completed with a voice recognition program.  Efforts were made to edit the dictations but occasionally words are mis-transcribed.)    Cipriano Bangura PA-C (electronically signed)  Attending Emergency Physician         Cipriano Bangura PA-C  10/22/17 [id number]

## 2017-10-22 NOTE — CONSULTS
Consult Note    Reason for Consult:  Medical mgmt    Requesting Physician:  Gianfranco Bosch MD    HISTORY OF PRESENT ILLNESS:    The patient is a 80 y.o. female w/hx of chronic R knee prosthetic infection, DVT, HTN, s/p pacer who presents s/p fall of unk mechanism. She is unsure of whether she had dizziness prior to fall. She states she was ambulating to restroom and \"just went down\". She does deny striking her head, CP, palp, SOB. Imaging confirmed R hip fracture. Hospitalist group consulted for pre-operative clearance and medical mgmt. ADDENDUM:  Call from nursing staff:  Per radiology pt has odontoid fx that is likely subacute but new since imaging from 2012. Neurosurgery has been consulted. Past Medical History:   Diagnosis Date    Back pain     Constipation     Diverticulosis     DVT (deep venous thrombosis) (HCC)     H/O echocardiogram     EF normal    Hearing loss     Heart block AV third degree (HCC)     HTN (hypertension)     Osteoporosis     Pacemaker     Peripheral neuropathy ([address]     Pulmonary hypertension     Pulmonary nodule, left     Thrombocytopenia (HCC)     Tricuspid regurgitation     Tubular adenoma 2010       Past Surgical History:   Procedure Laterality Date    COLONOSCOPY  1990s    declines further-hx adenoma    HYSTERECTOMY  1965    NO CANCER    SHOULDER ARTHROPLASTY  1990s    right     TOTAL KNEE ARTHROPLASTY  2008    right       Prior to Admission medications    Medication Sig Start Date End Date Taking?  Authorizing Provider   doxycycline monohydrate (ADOXA) 100 MG tablet Take 1 tablet by mouth daily 8/15/17  Yes Aman Tomlisnon MD   spironolactone (ALDACTONE) 25 MG tablet Take 1 tablet by mouth daily 8/15/17  Yes Aman Tomlinson MD   Lactobacillus (PROBIOTIC ACIDOPHILUS) TABS Take 1 tablet by mouth 3 times daily 4/28/17  Yes Aman Tomlinson MD   docusate sodium (COLACE) 100 MG capsule Take 1 capsule by mouth daily 3/27/17  Yes Juany Smith Ivan Turcios MD   melatonin 5 MG TABS tablet Take 1 tablet by mouth nightly 3/27/17  Yes Barbara Avitia MD   Naproxen Sod-Diphenhydramine (ALEVE PM PO) Take by mouth    Historical Provider, MD   Magnesium Hydroxide (MILK OF MAGNESIA PO) Take by mouth    Historical Provider, MD   polyethylene glycol (GLYCOLAX) powder Take 17 g by mouth daily 4/17/17   Barbara Avitia MD       Scheduled Meds:   sodium chloride flush  3 mL Intravenous Q8H     Continuous Infusions:   PRN Meds:oxyCODONE-acetaminophen **OR** oxyCODONE-acetaminophen, morphine **OR** morphine, ondansetron, cyclobenzaprine    Allergies   Allergen Reactions    Bactrim [Sulfamethoxazole-Trimethoprim] Rash     Rash of arms and legs       Social History     Social History    Marital status:      Spouse name: N/A    Number of children: N/A    Years of education: N/A     Occupational History    Not on file. Social History Main Topics    Smoking status: Former Smoker     Types: Cigarettes    Smokeless tobacco: Never Used    Alcohol use No    Drug use: No    Sexual activity: No     Other Topics Concern    Not on file     Social History Narrative    Lives alone           Family History   Problem Relation Age of Onset    Cancer Mother     Other Father      heart attack.        Review Of Systems:   CONSTITUTIONAL:  negative  HEENT:  negative  RESPIRATORY:  negative  CARDIOVASCULAR:  negative  GASTROINTESTINAL:  negative  MUSCULOSKELETAL:  positive for  Muscle spasm  NEUROLOGICAL:  negative  BEHAVIOR/PSYCH:  negative    Physical Exam:  Vitals:    10/22/17 0207 10/22/17 0515 10/22/17 0629   BP: (!) 147/93 (!) 177/74 (!) 157/58   Pulse: 75 88 71   Resp:  18 18   Temp: 97.5 °F (36.4 °C)  97.7 °F (36.5 °C)   TempSrc: Oral  Oral   SpO2: 99% 98% 98%   Weight: 148 lb (67.1 kg)         CONSTITUTIONAL:  alert, mild distress, appears stated age and normal weight  EYES:  pupils equal, round and reactive to light, sclera clear and conjunctiva normal  ENT:  normocepalic, without obvious abnormality, atraumatic  NECK:  supple, symmetrical, trachea midline and skin normal  LUNGS:  no increased work of breathing, no retractions and clear to auscultation  CARDIOVASCULAR:  normal apical pulses and normal S1 and S2  ABDOMEN:  normal bowel sounds, non-distended and non-tender  MUSCULOSKELETAL:  RLE shortened w/lateral rotation, BL knee swelling R>L, + distal pulses  NEUROLOGIC:  Mental Status Exam:  Level of Alertness:   awake  Orientation:   person, place, time  SKIN:  normal skin color, texture, turgor    Labs:  Recent Results (from the past 72 hour(s))   CBC Auto Differential    Collection Time: 10/22/17  2:29 AM   Result Value Ref Range    WBC 4.9 4.8 - 10.8 K/uL    RBC 3.98 (L) 4.20 - 5.40 M/uL    Hemoglobin 13.0 12.0 - 16.0 g/dL    Hematocrit 38.9 37.0 - 47.0 %    MCV 97.7 82.0 - 100.0 fL    MCH 32.7 (H) 27.0 - 31.3 pg    MCHC 33.5 33.0 - 37.0 %    RDW 13.3 11.5 - 14.5 %    Platelets 92 (L) 164 - 400 K/uL    PLATELET SLIDE REVIEW Decreased     Neutrophils % 44.7 %    Lymphocytes % 42.2 %    Monocytes % 11.1 %    Eosinophils % 1.3 %    Basophils % 0.7 %    Neutrophils # 2.2 1.4 - 6.5 K/uL    Lymphocytes # 2.1 1.0 - 4.8 K/uL    Monocytes # 0.5 0.2 - 0.8 K/uL    Eosinophils # 0.1 0.0 - 0.7 K/uL    Basophils # 0.0 0.0 - 0.2 K/uL   Comprehensive Metabolic Panel    Collection Time: 10/22/17  2:30 AM   Result Value Ref Range    Sodium 139 132 - 144 mEq/L    Potassium 4.3 3.5 - 5.1 mEq/L    Chloride 101 98 - 107 mEq/L    CO2 24 22 - 29 mEq/L    Anion Gap 14 (H) 7 - 13 mEq/L    Glucose 112 (H) 74 - 109 mg/dL    BUN 26 (H) 8 - 23 mg/dL    CREATININE 0.67 0.50 - 0.90 mg/dL    GFR Non-African American >60.0 >60    GFR  >60.0 >60    Calcium 9.6 8.6 - 10.2 mg/dL    Total Protein 7.7 6.4 - 8.1 g/dL    Alb 3.7 (L) 3.9 - 4.9 g/dL    Total Bilirubin 0.6 0.0 - 1.2 mg/dL    Alkaline Phosphatase 106 40 - 130 U/L    ALT 14 0 - 33 U/L    AST 23 0 - 35 U/L

## 2017-10-22 NOTE — CONSULTS
PT WITH HX OF MULTIPLE FALLS,WITH PREVIOUS L2. L3 LUMBAR COMPRESSION FRACTURES  IN JANUARY 2016, WHO HAD A FALL LAST NIGHT WITH A HIP FRACTURE. A CT BRAIN AND NECK SHOWS A TYPE 2 SUBACUTE ODONTOID FRACTURE, WITH IRREGULAR MARGINS, NO PREVERTEBRAL SWELLING  PT DENIES NECK PAIN, BUT THIS IS A POTENTIALLY UNSTABLE CONDITION THAT WILL NEED FURTHER STUDIES ONCE HER HIP FRACTURE HAS BEEN STABILIZED. A SOFT COLLAR SHOULD BE FINE AT THIS TIME.  FLEXION /EXTENSION FILMS COULD BE DONE WITH FLUROSCOPY  AT A LATER TIME  WILL FOLLOW

## 2017-10-22 NOTE — CONSULTS
[address]                     [address], [address] [facility]                                 CONSULTATION    PATIENT NAME: Juan Lloyd                         :             1924  MED REC NO:   [id number]                           ROOM:           [address]  ACCOUNT NO:   [de-identified]                          ADMISSION DATE:  10/22/2017  PROVIDER:     Valentine Keith MD    CONSULT DATE:  10/22/2017    CHIEF COMPLAINT:  Evaluation of cervical odontoid fracture. HISTORY OF PRESENT ILLNESS:  The patient is a 27-year-old lady who  lives by herself who reported to the emergency room after she fell  while going to the bathroom in the morning. The patient complained of  right hip pain, apparently the leg folded under her. She did not have  any neck pain, headaches. She did have laceration of the right  finger. She has a history of neuropathy. She has had knee  replacements on the right side as well as a right shoulder  replacement. The patient was admitted in 2016 with L2-L3 vertebral  compression fractures which was managed conservatively and had been  doing relatively well without any significant axial pain. During her  current evaluation, after examining the hip, she also had CT of  cervical spine as a routine and this in fact showed a type 2 ununited  odontoid fracture with minimal retrodisplacement without any  significant bone encroachment. There is no prevertebral swelling and  the fracture line appears irregular and appears relatively chronic. The patient denies any neck pain in the recent past or any other falls  besides the one already been mentioned. The patient has had multiple  visits to the nursing home for various problems in the recent several  years including infections, lacerations after repeated falling. PHYSICAL EXAMINATION:  GENERAL:  The patient, on exam, is a very pleasant and alert lady who  appears in no severe distress.   She feels really

## 2017-10-22 NOTE — PROGRESS NOTES
Ortho h and p dict  Surgery tomorrow   Medical clearance pending   Npo p 12 m   permit for short im nail tomorrow

## 2017-10-22 NOTE — CONSULTS
Smoker     Types: Cigarettes    Smokeless tobacco: Never Used    Alcohol use No    Drug use: No    Sexual activity: No     Other Topics Concern    None     Social History Narrative    Lives alone           Family Hx:  Family History   Problem Relation Age of Onset    Cancer Mother     Other Father      heart attack. Review of Systems:   Review of Systems   Unable to perform ROS: Acuity of condition         Physical Examination:    BP (!) 157/58   Pulse 71   Temp 97.7 °F (36.5 °C) (Oral)   Resp 18   Wt 148 lb (67.1 kg)   SpO2 98%   BMI 24.63 kg/m²    Physical Exam   Constitutional: She appears lethargic. Neck: JVD present. Cardiovascular: Normal rate and regular rhythm. Murmur heard. Pulmonary/Chest: Breath sounds normal. No respiratory distress. She has no wheezes. She has no rales. Abdominal: Soft. Bowel sounds are normal. There is no tenderness. There is no rebound and no guarding. Musculoskeletal: She exhibits no edema. Neurological: She appears lethargic.        LABS:  CBC:   Lab Results   Component Value Date    WBC 4.9 10/22/2017    RBC 3.98 10/22/2017    RBC 3.30 05/04/2012    HGB 13.0 10/22/2017    HCT 38.9 10/22/2017    MCV 97.7 10/22/2017    MCH 32.7 10/22/2017    MCHC 33.5 10/22/2017    RDW 13.3 10/22/2017    PLT 92 10/22/2017    MPV 10.2 07/15/2015     CBC with Differential:    Lab Results   Component Value Date    WBC 4.9 10/22/2017    RBC 3.98 10/22/2017    RBC 3.30 05/04/2012    HGB 13.0 10/22/2017    HCT 38.9 10/22/2017    PLT 92 10/22/2017    MCV 97.7 10/22/2017    MCH 32.7 10/22/2017    MCHC 33.5 10/22/2017    RDW 13.3 10/22/2017    LYMPHOPCT 42.2 10/22/2017    MONOPCT 11.1 10/22/2017    EOSPCT 1.0 05/03/2012    BASOPCT 0.7 10/22/2017    MONOSABS 0.5 10/22/2017    LYMPHSABS 2.1 10/22/2017    EOSABS 0.1 10/22/2017    BASOSABS 0.0 10/22/2017     CMP:    Lab Results   Component Value Date     10/22/2017    K 4.3 10/22/2017     10/22/2017    CO2 24 10/22/2017

## 2017-10-22 NOTE — PROGRESS NOTES
Pt admitted from home s/p fall at home. Pt states she was getting up to go to the bathroom and fell, used her lifealert for assistance. Awake alert and oriented x 3. Vang cath 16 Bengali inserted in ER. Noted clear yellow urine. Circulation check preformed and pedal pulses +3 bilat.

## 2017-10-23 ENCOUNTER — ANESTHESIA (OUTPATIENT)
Dept: OPERATING ROOM | Age: 82
DRG: 481 | End: 2017-10-23
Payer: MEDICARE

## 2017-10-23 ENCOUNTER — APPOINTMENT (OUTPATIENT)
Dept: GENERAL RADIOLOGY | Age: 82
DRG: 481 | End: 2017-10-23
Payer: MEDICARE

## 2017-10-23 ENCOUNTER — ANESTHESIA EVENT (OUTPATIENT)
Dept: OPERATING ROOM | Age: 82
DRG: 481 | End: 2017-10-23
Payer: MEDICARE

## 2017-10-23 VITALS — SYSTOLIC BLOOD PRESSURE: 121 MMHG | TEMPERATURE: 94.1 F | DIASTOLIC BLOOD PRESSURE: 61 MMHG | OXYGEN SATURATION: 100 %

## 2017-10-23 LAB
ABO/RH: NORMAL
ANION GAP SERPL CALCULATED.3IONS-SCNC: 11 MEQ/L (ref 7–13)
ANTIBODY SCREEN: NORMAL
BASOPHILS ABSOLUTE: 0 K/UL (ref 0–0.2)
BASOPHILS RELATIVE PERCENT: 0.3 %
BUN BLDV-MCNC: 20 MG/DL (ref 8–23)
CALCIUM SERPL-MCNC: 9.1 MG/DL (ref 8.6–10.2)
CHLORIDE BLD-SCNC: 101 MEQ/L (ref 98–107)
CO2: 25 MEQ/L (ref 22–29)
CREAT SERPL-MCNC: 0.58 MG/DL (ref 0.5–0.9)
EOSINOPHILS ABSOLUTE: 0 K/UL (ref 0–0.7)
EOSINOPHILS RELATIVE PERCENT: 0.4 %
GFR AFRICAN AMERICAN: >60
GFR NON-AFRICAN AMERICAN: >60
GLUCOSE BLD-MCNC: 122 MG/DL (ref 74–109)
HCT VFR BLD CALC: 34.5 % (ref 37–47)
HEMOGLOBIN: 11.6 G/DL (ref 12–16)
LV EF: 60 %
LVEF MODALITY: NORMAL
LYMPHOCYTES ABSOLUTE: 0.9 K/UL (ref 1–4.8)
LYMPHOCYTES RELATIVE PERCENT: 14 %
MAGNESIUM: 2 MG/DL (ref 1.7–2.3)
MCH RBC QN AUTO: 33 PG (ref 27–31.3)
MCHC RBC AUTO-ENTMCNC: 33.7 % (ref 33–37)
MCV RBC AUTO: 98.1 FL (ref 82–100)
MONOCYTES ABSOLUTE: 0.9 K/UL (ref 0.2–0.8)
MONOCYTES RELATIVE PERCENT: 14.1 %
NEUTROPHILS ABSOLUTE: 4.6 K/UL (ref 1.4–6.5)
NEUTROPHILS RELATIVE PERCENT: 71.2 %
PDW BLD-RTO: 13.2 % (ref 11.5–14.5)
PLATELET # BLD: 83 K/UL (ref 130–400)
POTASSIUM SERPL-SCNC: 4.3 MEQ/L (ref 3.5–5.1)
RBC # BLD: 3.52 M/UL (ref 4.2–5.4)
SODIUM BLD-SCNC: 137 MEQ/L (ref 132–144)
URINE CULTURE, ROUTINE: NORMAL
WBC # BLD: 6.4 K/UL (ref 4.8–10.8)

## 2017-10-23 PROCEDURE — 6370000000 HC RX 637 (ALT 250 FOR IP): Performed by: PHYSICIAN ASSISTANT

## 2017-10-23 PROCEDURE — C1713 ANCHOR/SCREW BN/BN,TIS/BN: HCPCS | Performed by: ORTHOPAEDIC SURGERY

## 2017-10-23 PROCEDURE — 6360000002 HC RX W HCPCS: Performed by: NURSE ANESTHETIST, CERTIFIED REGISTERED

## 2017-10-23 PROCEDURE — 83735 ASSAY OF MAGNESIUM: CPT

## 2017-10-23 PROCEDURE — 3600000014 HC SURGERY LEVEL 4 ADDTL 15MIN: Performed by: ORTHOPAEDIC SURGERY

## 2017-10-23 PROCEDURE — 2720000010 HC SURG SUPPLY STERILE: Performed by: ORTHOPAEDIC SURGERY

## 2017-10-23 PROCEDURE — 7100000001 HC PACU RECOVERY - ADDTL 15 MIN: Performed by: ORTHOPAEDIC SURGERY

## 2017-10-23 PROCEDURE — 85025 COMPLETE CBC W/AUTO DIFF WBC: CPT

## 2017-10-23 PROCEDURE — 3209999900 FLUORO FOR SURGICAL PROCEDURES

## 2017-10-23 PROCEDURE — 86850 RBC ANTIBODY SCREEN: CPT

## 2017-10-23 PROCEDURE — 2580000003 HC RX 258: Performed by: ORTHOPAEDIC SURGERY

## 2017-10-23 PROCEDURE — 86901 BLOOD TYPING SEROLOGIC RH(D): CPT

## 2017-10-23 PROCEDURE — 86900 BLOOD TYPING SEROLOGIC ABO: CPT

## 2017-10-23 PROCEDURE — 93306 TTE W/DOPPLER COMPLETE: CPT

## 2017-10-23 PROCEDURE — 36415 COLL VENOUS BLD VENIPUNCTURE: CPT

## 2017-10-23 PROCEDURE — C1769 GUIDE WIRE: HCPCS | Performed by: ORTHOPAEDIC SURGERY

## 2017-10-23 PROCEDURE — 99223 1ST HOSP IP/OBS HIGH 75: CPT | Performed by: INTERNAL MEDICINE

## 2017-10-23 PROCEDURE — 80048 BASIC METABOLIC PNL TOTAL CA: CPT

## 2017-10-23 PROCEDURE — 86920 COMPATIBILITY TEST SPIN: CPT

## 2017-10-23 PROCEDURE — 0QS636Z REPOSITION RIGHT UPPER FEMUR WITH INTRAMEDULLARY INTERNAL FIXATION DEVICE, PERCUTANEOUS APPROACH: ICD-10-PCS | Performed by: ORTHOPAEDIC SURGERY

## 2017-10-23 PROCEDURE — 3700000001 HC ADD 15 MINUTES (ANESTHESIA): Performed by: ORTHOPAEDIC SURGERY

## 2017-10-23 PROCEDURE — 6360000002 HC RX W HCPCS: Performed by: NURSE PRACTITIONER

## 2017-10-23 PROCEDURE — 2580000003 HC RX 258: Performed by: PHYSICIAN ASSISTANT

## 2017-10-23 PROCEDURE — 2500000003 HC RX 250 WO HCPCS: Performed by: NURSE ANESTHETIST, CERTIFIED REGISTERED

## 2017-10-23 PROCEDURE — 2580000003 HC RX 258: Performed by: NURSE ANESTHETIST, CERTIFIED REGISTERED

## 2017-10-23 PROCEDURE — 2500000003 HC RX 250 WO HCPCS: Performed by: ORTHOPAEDIC SURGERY

## 2017-10-23 PROCEDURE — 7100000000 HC PACU RECOVERY - FIRST 15 MIN: Performed by: ORTHOPAEDIC SURGERY

## 2017-10-23 PROCEDURE — 3700000000 HC ANESTHESIA ATTENDED CARE: Performed by: ORTHOPAEDIC SURGERY

## 2017-10-23 PROCEDURE — 6370000000 HC RX 637 (ALT 250 FOR IP): Performed by: ORTHOPAEDIC SURGERY

## 2017-10-23 PROCEDURE — 93005 ELECTROCARDIOGRAM TRACING: CPT

## 2017-10-23 PROCEDURE — 1210000000 HC MED SURG R&B

## 2017-10-23 PROCEDURE — 3600000004 HC SURGERY LEVEL 4 BASE: Performed by: ORTHOPAEDIC SURGERY

## 2017-10-23 DEVICE — SCREW BNE L36MM DIA5MM TIB LT GRN TI ST CANN LOK FULL THRD: Type: IMPLANTABLE DEVICE | Status: FUNCTIONAL

## 2017-10-23 DEVICE — NAIL IM L235MM DIA10MM 130DEG SHT R PROX FEM GRN TI CANN: Type: IMPLANTABLE DEVICE | Status: FUNCTIONAL

## 2017-10-23 RX ORDER — MEPERIDINE HYDROCHLORIDE 25 MG/ML
12.5 INJECTION INTRAMUSCULAR; INTRAVENOUS; SUBCUTANEOUS EVERY 5 MIN PRN
Status: DISCONTINUED | OUTPATIENT
Start: 2017-10-23 | End: 2017-10-23 | Stop reason: HOSPADM

## 2017-10-23 RX ORDER — OXYCODONE HYDROCHLORIDE AND ACETAMINOPHEN 5; 325 MG/1; MG/1
2 TABLET ORAL EVERY 4 HOURS PRN
Status: DISCONTINUED | OUTPATIENT
Start: 2017-10-23 | End: 2017-10-24

## 2017-10-23 RX ORDER — PROPOFOL 10 MG/ML
INJECTION, EMULSION INTRAVENOUS PRN
Status: DISCONTINUED | OUTPATIENT
Start: 2017-10-23 | End: 2017-10-23 | Stop reason: SDUPTHER

## 2017-10-23 RX ORDER — ONDANSETRON 2 MG/ML
4 INJECTION INTRAMUSCULAR; INTRAVENOUS EVERY 6 HOURS PRN
Status: DISCONTINUED | OUTPATIENT
Start: 2017-10-23 | End: 2017-10-26 | Stop reason: HOSPADM

## 2017-10-23 RX ORDER — MORPHINE SULFATE 2 MG/ML
2 INJECTION, SOLUTION INTRAMUSCULAR; INTRAVENOUS
Status: DISCONTINUED | OUTPATIENT
Start: 2017-10-23 | End: 2017-10-24

## 2017-10-23 RX ORDER — HYDROCODONE BITARTRATE AND ACETAMINOPHEN 5; 325 MG/1; MG/1
1 TABLET ORAL PRN
Status: DISCONTINUED | OUTPATIENT
Start: 2017-10-23 | End: 2017-10-23 | Stop reason: HOSPADM

## 2017-10-23 RX ORDER — HYDROCODONE BITARTRATE AND ACETAMINOPHEN 5; 325 MG/1; MG/1
2 TABLET ORAL PRN
Status: DISCONTINUED | OUTPATIENT
Start: 2017-10-23 | End: 2017-10-23 | Stop reason: HOSPADM

## 2017-10-23 RX ORDER — FENTANYL CITRATE 50 UG/ML
50 INJECTION, SOLUTION INTRAMUSCULAR; INTRAVENOUS EVERY 10 MIN PRN
Status: DISCONTINUED | OUTPATIENT
Start: 2017-10-23 | End: 2017-10-23 | Stop reason: HOSPADM

## 2017-10-23 RX ORDER — SODIUM CHLORIDE 9 MG/ML
INJECTION, SOLUTION INTRAVENOUS
Status: DISPENSED
Start: 2017-10-23 | End: 2017-10-23

## 2017-10-23 RX ORDER — MORPHINE SULFATE 4 MG/ML
4 INJECTION, SOLUTION INTRAMUSCULAR; INTRAVENOUS
Status: DISCONTINUED | OUTPATIENT
Start: 2017-10-23 | End: 2017-10-24

## 2017-10-23 RX ORDER — ONDANSETRON 2 MG/ML
4 INJECTION INTRAMUSCULAR; INTRAVENOUS
Status: DISCONTINUED | OUTPATIENT
Start: 2017-10-23 | End: 2017-10-23 | Stop reason: HOSPADM

## 2017-10-23 RX ORDER — SENNA AND DOCUSATE SODIUM 50; 8.6 MG/1; MG/1
1 TABLET, FILM COATED ORAL DAILY
Status: DISCONTINUED | OUTPATIENT
Start: 2017-10-23 | End: 2017-10-26 | Stop reason: HOSPADM

## 2017-10-23 RX ORDER — ACETAMINOPHEN 325 MG/1
650 TABLET ORAL EVERY 4 HOURS PRN
Status: DISCONTINUED | OUTPATIENT
Start: 2017-10-23 | End: 2017-10-26 | Stop reason: HOSPADM

## 2017-10-23 RX ORDER — SODIUM CHLORIDE 0.9 % (FLUSH) 0.9 %
10 SYRINGE (ML) INJECTION PRN
Status: DISCONTINUED | OUTPATIENT
Start: 2017-10-23 | End: 2017-10-26 | Stop reason: HOSPADM

## 2017-10-23 RX ORDER — ONDANSETRON 2 MG/ML
INJECTION INTRAMUSCULAR; INTRAVENOUS PRN
Status: DISCONTINUED | OUTPATIENT
Start: 2017-10-23 | End: 2017-10-23 | Stop reason: SDUPTHER

## 2017-10-23 RX ORDER — SODIUM CHLORIDE, SODIUM LACTATE, POTASSIUM CHLORIDE, CALCIUM CHLORIDE 600; 310; 30; 20 MG/100ML; MG/100ML; MG/100ML; MG/100ML
INJECTION, SOLUTION INTRAVENOUS CONTINUOUS PRN
Status: DISCONTINUED | OUTPATIENT
Start: 2017-10-23 | End: 2017-10-23 | Stop reason: SDUPTHER

## 2017-10-23 RX ORDER — FENTANYL CITRATE 50 UG/ML
INJECTION, SOLUTION INTRAMUSCULAR; INTRAVENOUS PRN
Status: DISCONTINUED | OUTPATIENT
Start: 2017-10-23 | End: 2017-10-23 | Stop reason: SDUPTHER

## 2017-10-23 RX ORDER — LIDOCAINE HYDROCHLORIDE 20 MG/ML
INJECTION, SOLUTION EPIDURAL; INFILTRATION; INTRACAUDAL; PERINEURAL PRN
Status: DISCONTINUED | OUTPATIENT
Start: 2017-10-23 | End: 2017-10-23 | Stop reason: SDUPTHER

## 2017-10-23 RX ORDER — MAGNESIUM HYDROXIDE 1200 MG/15ML
LIQUID ORAL CONTINUOUS PRN
Status: DISCONTINUED | OUTPATIENT
Start: 2017-10-23 | End: 2017-10-23 | Stop reason: HOSPADM

## 2017-10-23 RX ORDER — SODIUM CHLORIDE 9 MG/ML
INJECTION, SOLUTION INTRAVENOUS CONTINUOUS
Status: DISCONTINUED | OUTPATIENT
Start: 2017-10-23 | End: 2017-10-26

## 2017-10-23 RX ORDER — DIPHENHYDRAMINE HYDROCHLORIDE 50 MG/ML
12.5 INJECTION INTRAMUSCULAR; INTRAVENOUS
Status: DISCONTINUED | OUTPATIENT
Start: 2017-10-23 | End: 2017-10-23 | Stop reason: HOSPADM

## 2017-10-23 RX ORDER — SODIUM CHLORIDE 0.9 % (FLUSH) 0.9 %
10 SYRINGE (ML) INJECTION EVERY 12 HOURS SCHEDULED
Status: DISCONTINUED | OUTPATIENT
Start: 2017-10-23 | End: 2017-10-26 | Stop reason: HOSPADM

## 2017-10-23 RX ORDER — DOCUSATE SODIUM 100 MG/1
100 CAPSULE, LIQUID FILLED ORAL 2 TIMES DAILY
Status: DISCONTINUED | OUTPATIENT
Start: 2017-10-23 | End: 2017-10-26 | Stop reason: HOSPADM

## 2017-10-23 RX ORDER — SODIUM CHLORIDE 9 MG/ML
INJECTION, SOLUTION INTRAVENOUS CONTINUOUS
Status: DISCONTINUED | OUTPATIENT
Start: 2017-10-23 | End: 2017-10-24

## 2017-10-23 RX ORDER — METOCLOPRAMIDE HYDROCHLORIDE 5 MG/ML
10 INJECTION INTRAMUSCULAR; INTRAVENOUS
Status: DISCONTINUED | OUTPATIENT
Start: 2017-10-23 | End: 2017-10-23 | Stop reason: HOSPADM

## 2017-10-23 RX ORDER — TRAMADOL HYDROCHLORIDE 50 MG/1
50 TABLET ORAL EVERY 4 HOURS PRN
Status: DISCONTINUED | OUTPATIENT
Start: 2017-10-23 | End: 2017-10-26 | Stop reason: HOSPADM

## 2017-10-23 RX ORDER — BUPIVACAINE HYDROCHLORIDE 5 MG/ML
INJECTION, SOLUTION EPIDURAL; INTRACAUDAL PRN
Status: DISCONTINUED | OUTPATIENT
Start: 2017-10-23 | End: 2017-10-23 | Stop reason: HOSPADM

## 2017-10-23 RX ORDER — OXYCODONE HYDROCHLORIDE AND ACETAMINOPHEN 5; 325 MG/1; MG/1
1 TABLET ORAL EVERY 4 HOURS PRN
Status: DISCONTINUED | OUTPATIENT
Start: 2017-10-23 | End: 2017-10-26 | Stop reason: HOSPADM

## 2017-10-23 RX ADMIN — Medication 5 MG: at 21:02

## 2017-10-23 RX ADMIN — CEFAZOLIN SODIUM 2 G: 2 SOLUTION INTRAVENOUS at 16:00

## 2017-10-23 RX ADMIN — FENTANYL CITRATE 25 MCG: 50 INJECTION, SOLUTION INTRAMUSCULAR; INTRAVENOUS at 16:45

## 2017-10-23 RX ADMIN — DOCUSATE SODIUM 100 MG: 100 CAPSULE, LIQUID FILLED ORAL at 21:02

## 2017-10-23 RX ADMIN — ONDANSETRON 4 MG: 2 INJECTION INTRAMUSCULAR; INTRAVENOUS at 17:18

## 2017-10-23 RX ADMIN — SODIUM CHLORIDE: 9 INJECTION, SOLUTION INTRAVENOUS at 21:01

## 2017-10-23 RX ADMIN — PHENYLEPHRINE HYDROCHLORIDE 100 MCG: 10 INJECTION INTRAVENOUS at 17:19

## 2017-10-23 RX ADMIN — FENTANYL CITRATE 25 MCG: 50 INJECTION, SOLUTION INTRAMUSCULAR; INTRAVENOUS at 16:54

## 2017-10-23 RX ADMIN — LIDOCAINE HYDROCHLORIDE 60 MG: 20 INJECTION, SOLUTION EPIDURAL; INFILTRATION; INTRACAUDAL; PERINEURAL at 15:51

## 2017-10-23 RX ADMIN — SODIUM CHLORIDE, POTASSIUM CHLORIDE, SODIUM LACTATE AND CALCIUM CHLORIDE: 600; 310; 30; 20 INJECTION, SOLUTION INTRAVENOUS at 17:15

## 2017-10-23 RX ADMIN — PHENYLEPHRINE HYDROCHLORIDE 100 MCG: 10 INJECTION INTRAVENOUS at 16:19

## 2017-10-23 RX ADMIN — PHENYLEPHRINE HYDROCHLORIDE 100 MCG: 10 INJECTION INTRAVENOUS at 17:10

## 2017-10-23 RX ADMIN — LACTOBACILLUS TAB 1 TABLET: TAB at 21:02

## 2017-10-23 RX ADMIN — PHENYLEPHRINE HYDROCHLORIDE 100 MCG: 10 INJECTION INTRAVENOUS at 16:37

## 2017-10-23 RX ADMIN — Medication 3 ML: at 04:37

## 2017-10-23 RX ADMIN — SENNOSIDES AND DOCUSATE SODIUM 1 TABLET: 8.6; 5 TABLET ORAL at 21:02

## 2017-10-23 RX ADMIN — PROPOFOL 100 MG: 10 INJECTION, EMULSION INTRAVENOUS at 15:51

## 2017-10-23 RX ADMIN — SODIUM CHLORIDE: 900 INJECTION, SOLUTION INTRAVENOUS at 11:37

## 2017-10-23 RX ADMIN — FENTANYL CITRATE 25 MCG: 50 INJECTION, SOLUTION INTRAMUSCULAR; INTRAVENOUS at 16:05

## 2017-10-23 RX ADMIN — FENTANYL CITRATE 25 MCG: 50 INJECTION, SOLUTION INTRAMUSCULAR; INTRAVENOUS at 17:00

## 2017-10-23 RX ADMIN — PHENYLEPHRINE HYDROCHLORIDE 100 MCG: 10 INJECTION INTRAVENOUS at 16:44

## 2017-10-23 ASSESSMENT — ENCOUNTER SYMPTOMS
COUGH: 0
SHORTNESS OF BREATH: 0
NAUSEA: 0

## 2017-10-23 NOTE — PLAN OF CARE
Problem: Falls - Risk of  Goal: Absence of falls  Outcome: Met This Shift      Problem: Risk for Impaired Skin Integrity  Goal: Tissue integrity - skin and mucous membranes  Structural intactness and normal physiological function of skin and  mucous membranes.    Outcome: Met This Shift

## 2017-10-23 NOTE — CONSULTS
Inpatient consult to Infectious Diseases  Consult performed by: Marsha Overton  Consult ordered by: Amy Garcia  7/28/1924  female  Medical Record Number: [id number]      HPI:    Patient with hx of R knee retained joint and infection ( no cultures ) on PO Doxy suppressive therapy s/p fall and right hip fracture requiring surgery. ID consulted. Medical history reviewed - was under the care of Dr Rand Stone then Dr John Mcelroy. On PO Doxy suppression for the right knee for life. Cannot find the actual infective organism. Urine culture negative. Patient feels ok. Subjectively, no new complaints at this time. Review of Systems: All 14 review of systems were discussed with the patient and are negative other than as stated above      Past Medical History:   Diagnosis Date    Back pain     Constipation     Diverticulosis     DVT (deep venous thrombosis) ([address]     H/O echocardiogram     EF normal    Hearing loss     Heart block AV third degree (HCC)     HTN (hypertension)     Osteoporosis     Pacemaker     Peripheral neuropathy ([address]     Pulmonary hypertension     Pulmonary nodule, left     Thrombocytopenia (HCC)     Tricuspid regurgitation     Tubular adenoma 2010       Past Surgical History:   Procedure Laterality Date    COLONOSCOPY  1990s    declines further-hx adenoma    HYSTERECTOMY  1965    NO CANCER    SHOULDER ARTHROPLASTY  1990s    right     TOTAL KNEE ARTHROPLASTY  2008    right       Social History     Social History    Marital status:      Spouse name: N/A    Number of children: N/A    Years of education: N/A     Occupational History    Not on file.      Social History Main Topics    Smoking status: Former Smoker     Types: Cigarettes    Smokeless tobacco: Never Used    Alcohol use No    Drug use: No    Sexual activity: No     Other Topics Concern    Not on file     Social History Narrative    Lives alone           Family History   Problem Relation Age of Onset    Cancer Mother    Donna Sawyer Other Father      heart attack. No current facility-administered medications on file prior to encounter. Current Outpatient Prescriptions on File Prior to Encounter   Medication Sig Dispense Refill    doxycycline monohydrate (ADOXA) 100 MG tablet Take 1 tablet by mouth daily 90 tablet 1    spironolactone (ALDACTONE) 25 MG tablet Take 1 tablet by mouth daily 90 tablet 1    Lactobacillus (PROBIOTIC ACIDOPHILUS) TABS Take 1 tablet by mouth 3 times daily 90 tablet 0    docusate sodium (COLACE) 100 MG capsule Take 1 capsule by mouth daily 9 capsule 3    melatonin 5 MG TABS tablet Take 1 tablet by mouth nightly 90 tablet 3    Naproxen Sod-Diphenhydramine (ALEVE PM PO) Take by mouth      Magnesium Hydroxide (MILK OF MAGNESIA PO) Take by mouth      polyethylene glycol (GLYCOLAX) powder Take 17 g by mouth daily 1 Bottle 1       Physical Exam:    General: Patient appears in no acute distress, cooperative, s/p surgical intervention of the right hip  HEENT:  Neck is supple  Heart: S1 S2  Lungs: bilaterally clear  Abdomen: soft, ND  Extrem: s/p R hip surgery  Neuro exam: post surgical so sleepy    Labs: I have reviewed all lab results by electronic record, including most recent CBC, metabolic panel, and pertinent abnormalities were addressed from an infectious disease perspective. WBC trends are being monitored.     Lab Results   Component Value Date     10/23/2017    K 4.3 10/23/2017     10/23/2017    CO2 25 10/23/2017    BUN 20 10/23/2017    CREATININE 0.58 10/23/2017    GLUCOSE 122 10/23/2017    GLUCOSE 193 05/05/2012    CALCIUM 9.1 10/23/2017      Lab Results   Component Value Date    WBC 6.4 10/23/2017    HGB 11.6 (L) 10/23/2017    HCT 34.5 (L) 10/23/2017    MCV 98.1 10/23/2017    PLT 83 (L) 10/23/2017       Radiology:   I have reviewed imaging results per electronic record and most pertinent abnormalities are being addressed from an infectious disease standpoint. Assessment:  R hip intertrochanteric fracture, displaced, rotated, and shortened - s/p surgery  R knee infection with retained hardware. Plan:  UC negative, no fevers. No additional abx. Monitor. Patient is supposed to stay on PO Doxycycline for life due to R knee chronic infection  Case discussed with family.      Tamika Booth D.O.

## 2017-10-23 NOTE — ANESTHESIA PRE PROCEDURE
Department of Anesthesiology  Preprocedure Note       Name:  Cheryl Davis   Age:  80 y.o.  :  1924                                          MRN:  [id number]         Date:  10/23/2017      Surgeon: Joey Martinez):  Birt Essex, MD    Procedure: Procedure(s):  TFN NAIL C-ARM RIGHT    Medications prior to admission:   Prior to Admission medications    Medication Sig Start Date End Date Taking?  Authorizing Provider   doxycycline monohydrate (ADOXA) 100 MG tablet Take 1 tablet by mouth daily 8/15/17  Yes Mark Brooks MD   spironolactone (ALDACTONE) 25 MG tablet Take 1 tablet by mouth daily 8/15/17  Yes Mark Brooks MD   Lactobacillus (PROBIOTIC ACIDOPHILUS) TABS Take 1 tablet by mouth 3 times daily 17  Yes Mark Brooks MD   docusate sodium (COLACE) 100 MG capsule Take 1 capsule by mouth daily 3/27/17  Yes Mark Brooks MD   melatonin 5 MG TABS tablet Take 1 tablet by mouth nightly 3/27/17  Yes Mark Brooks MD   Naproxen Sod-Diphenhydramine (ALEVE PM PO) Take by mouth    Historical Provider, MD   Magnesium Hydroxide (MILK OF MAGNESIA PO) Take by mouth    Historical Provider, MD   polyethylene glycol (GLYCOLAX) powder Take 17 g by mouth daily 17   Mark Broosk MD       Current medications:    Current Facility-Administered Medications   Medication Dose Route Frequency Provider Last Rate Last Dose    sodium chloride flush 0.9 % injection 3 mL  3 mL Intravenous Q8H Fatmua Dumont PA-C   3 mL at 10/23/17 0437    oxyCODONE-acetaminophen (PERCOCET) 5-325 MG per tablet 1 tablet  1 tablet Oral Q4H PRN Harish Becker MD        Or    oxyCODONE-acetaminophen (PERCOCET) 5-325 MG per tablet 2 tablet  2 tablet Oral Q4H PRN Harish Becker MD   2 tablet at 10/22/17 2053    morphine injection 2 mg  2 mg Intravenous Q2H PRN Harish Becker MD   2 mg at 10/22/17 1509    Or    morphine injection 4 mg  4 mg Intravenous Q2H PRN Harish Becker MD        ondansetron [facility]) injection 4 mg  4 mg Intravenous Q6H PRN Grant Genao MD        cyclobenzaprine (FLEXERIL) tablet 5 mg  5 mg Oral BID PRN Brenna Morgan PA-C   5 mg at 10/22/17 1127    docusate sodium (COLACE) capsule 100 mg  100 mg Oral Daily Brenna Morgan, PA-C   100 mg at 10/22/17 2053    lactobacillus acidophilus [facility]) 1 tablet  1 tablet Oral TID Brenna Morgan, PA-C   1 tablet at 10/22/17 2053    melatonin tablet 5 mg  5 mg Oral Nightly Brenna Morgan, PA-C   5 mg at 10/22/17 2053    polyethylene glycol (GLYCOLAX) packet 17 g  17 g Oral Daily Brenna Morgan, PA-C   17 g at 10/22/17 2052    spironolactone (ALDACTONE) tablet 25 mg  25 mg Oral Daily Brenna Morgan, PA-C   25 mg at 10/22/17 2053    doxycycline hyclate (VIBRAMYCIN) capsule 100 mg  100 mg Oral Daily Brenna Morgan, PA-C   100 mg at 10/22/17 2053       Allergies:     Allergies   Allergen Reactions    Bactrim [Sulfamethoxazole-Trimethoprim] Rash     Rash of arms and legs       Problem List:    Patient Active Problem List   Diagnosis Code    Back pain M54.9    HTN (hypertension) I10    Osteoporosis M81.0    Peripheral neuropathy ([address] G62.9    Tubular adenoma D36.9    Pacemaker Z95.0    Thrombocytopenia ([address] D69.6    Right knee skin infection L08.9    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, Z96.659    Closed right hip fracture ([address] S72.001A       Past Medical History:        Diagnosis Date    Back pain     Constipation     Diverticulosis     DVT (deep venous thrombosis) ([address]     H/O echocardiogram     EF normal    Hearing loss     Heart block AV third degree (HCC)     HTN (hypertension)     Osteoporosis     Pacemaker     Peripheral neuropathy ([address]     Pulmonary hypertension     Pulmonary nodule, left     Thrombocytopenia (HCC)     Tricuspid regurgitation     Tubular adenoma 2010       Past Surgical History:        Procedure Laterality Date    COLONOSCOPY  1990s    declines further-hx adenoma    HYSTERECTOMY  1965    NO CANCER    SHOULDER ARTHROPLASTY  1990s    right     TOTAL KNEE ARTHROPLASTY  2008    right       Social History:    Social History   Substance Use Topics    Smoking status: Former Smoker     Types: Cigarettes    Smokeless tobacco: Never Used    Alcohol use No                                Counseling given: Not Answered      Vital Signs (Current):   Vitals:    10/22/17 0629 10/22/17 1430 10/22/17 1431 10/22/17 1948   BP: (!) 157/58 (!) 171/75  128/61   Pulse: 71 84  89   Resp: 18   16   Temp: 97.7 °F (36.5 °C) 97.9 °F (36.6 °C)  99.7 °F (37.6 °C)   TempSrc: Oral   Oral   SpO2: 98%  98% 97%   Weight:                                                  BP Readings from Last 3 Encounters:   10/22/17 128/61   07/14/17 114/68   05/16/17 (!) 143/59       NPO Status:                                                                                 BMI:   Wt Readings from Last 3 Encounters:   10/22/17 148 lb (67.1 kg)   07/14/17 147 lb (66.7 kg)   05/16/17 147 lb 9.6 oz (67 kg)     Body mass index is 24.63 kg/m². CBC:   Lab Results   Component Value Date    WBC 6.4 10/23/2017    RBC 3.52 10/23/2017    RBC 3.30 05/04/2012    HGB 11.6 10/23/2017    HCT 34.5 10/23/2017    MCV 98.1 10/23/2017    RDW 13.2 10/23/2017    PLT 83 10/23/2017       CMP:   Lab Results   Component Value Date     10/23/2017    K 4.3 10/23/2017     10/23/2017    CO2 25 10/23/2017    BUN 20 10/23/2017    CREATININE 0.58 10/23/2017    GFRAA >60.0 10/23/2017    LABGLOM >60.0 10/23/2017    GLUCOSE 122 10/23/2017    GLUCOSE 193 05/05/2012    PROT 7.7 10/22/2017    CALCIUM 9.1 10/23/2017    BILITOT 0.6 10/22/2017    ALKPHOS 106 10/22/2017    AST 23 10/22/2017    ALT 14 10/22/2017       POC Tests: No results for input(s): POCGLU, POCNA, POCK, POCCL, POCBUN, POCHEMO, POCHCT in the last 72 hours. Coags:   Lab Results   Component Value Date    PROTIME 11.3 05/04/2012    INR 1.1 05/04/2012       HCG (If Applicable):  No results found for: PREGTESTUR, PREGSERUM, HCG, HCGQUANT     ABGs: No results found for: PHART, PO2ART, JEA8DSK, RCP9PKN, BEART, J4ECZOMS     Type & Screen (If Applicable):  No results found for: LABABO, [address]    Anesthesia Evaluation  Patient summary reviewed and Nursing notes reviewed no history of anesthetic complications:   Airway: Mallampati: II  TM distance: >3 FB   Neck ROM: full  Mouth opening: > = 3 FB Dental: normal exam         Pulmonary:                              Cardiovascular:    (+) hypertension:, pacemaker: pacemaker, dysrhythmias: paced rhythm,                ROS comment: EKG - paced  Underlying complete heart block     Neuro/Psych:   (+) neuromuscular disease:,             GI/Hepatic/Renal:             Endo/Other:                     Abdominal:           Vascular:   + DVT, . Anesthesia Plan      general     ASA 3       Induction: intravenous. MIPS: Postoperative opioids intended. Anesthetic plan and risks discussed with patient and child/children.                     Colleen Stanley MD   10/23/2017

## 2017-10-23 NOTE — PROGRESS NOTES
(COLACE) capsule 100 mg  100 mg Oral Daily Elen Salma, PA-C   100 mg at 10/22/17 2053    lactobacillus acidophilus [facility]) 1 tablet  1 tablet Oral TID Elen Rave, PA-C   1 tablet at 10/22/17 2053    melatonin tablet 5 mg  5 mg Oral Nightly Elen Rave, PA-C   5 mg at 10/22/17 2053    polyethylene glycol (GLYCOLAX) packet 17 g  17 g Oral Daily Elen Rave, PA-C   17 g at 10/22/17 2052    spironolactone (ALDACTONE) tablet 25 mg  25 mg Oral Daily Elen Rave, PA-C   25 mg at 10/22/17 2053    doxycycline hyclate (VIBRAMYCIN) capsule 100 mg  100 mg Oral Daily Elen Rave, PA-C   100 mg at 10/22/17 2053     Allergies   Allergen Reactions    Bactrim [Sulfamethoxazole-Trimethoprim] Rash     Rash of arms and legs     Principal Problem:    Closed right hip fracture (HCC)  Active Problems:    HTN (hypertension)    Pacemaker    Thrombocytopenia (HCC)    Blood pressure (!) 119/55, pulse 77, temperature 97.9 °F (36.6 °C), temperature source Oral, resp. rate 16, weight 148 lb (67.1 kg), SpO2 97 %, not currently breastfeeding. Subjective:  Symptoms:  She reports weakness. No shortness of breath, malaise, cough, chest pain, chest pressure or anxiety. Diet:  No nausea. Pain:  She reports no pain. Objective:  General Appearance:  Comfortable, well-appearing and in no acute distress. Vital signs: (most recent): Blood pressure (!) 119/55, pulse 77, temperature 97.9 °F (36.6 °C), temperature source Oral, resp. rate 16, weight 148 lb (67.1 kg), SpO2 97 %, not currently breastfeeding. Lungs:  Normal effort and normal respiratory rate. Breath sounds clear to auscultation. Heart: Regular rhythm. S1 normal and S2 normal.  Positive for murmur. Abdomen: Abdomen is soft. Bowel sounds are normal.     Pulses: Distal pulses are intact. Neurological: Patient is alert and oriented to person, place and time. Pupils:  Pupils are equal, round, and reactive to light.     Skin:  Warm

## 2017-10-23 NOTE — CARE COORDINATION
IVONE along with the care team met with pt to discuss DC needs. Pt fell at home and has a fx of the hip. She will have surgery today and then she wants to go to rehab. She has been to [facility] in the past so IVONE gave her info to Sus[facility] in Med Surg to follow up after surgery. Pt will need a precert. Pt is also considering [employer] and [facility]. They were notified as well.

## 2017-10-23 NOTE — CARE COORDINATION
Family has now decided that [facility] is first choice and [facility] is second choice. Info provided to both.

## 2017-10-23 NOTE — OP NOTE
Orthopaedic Surgery Operative Note (CSN: [id number])  Date of Surgery: 10/23/2017  Admit Date: 10/22/2017    Preoperative Diagnosis: RIGHT PERTROCHANTERIC HIP FRACTURE    Postoperative Diagnosis: Same    Procedure: Right hip cephalomedullary nail - 40310     Surgeons: Primary: Fadi Cartagena MD    Staff: Paulie Espinoza Person First: India Cueto  Physician Assistant: Daniel Hamlin PA-C    Anesthesia: General    Estimated Blood Loss: 916 mL    Complications: None known    Implants:  Implant Name Type Inv. Item Serial No.  Lot No. LRB No. Used Action   NAIL TFNA TI KALLI 10MM/130DEG RT 235MM Screw/Plate/Nail/Dominic NAIL TFNA TI KALLI 10MM/130DEG RT 235MM  G-CON [id number] Right 1 Implanted   TFNA SCREW 90MM    G-CON [id number] Right 1 Implanted   SCREW LK W/ T25 STARDRIVE [id number] Screw/Plate/Nail/Dominic SCREW LK W/ T25 STARDRIVE [id number]   G-CON [id number] Right 1 Implanted       Indications for Surgery: Sapna Virgen is a 80 y.o. female who sustained a comminuted intertrochanteric/subtrochanteric hip fracture. The benefits and risks of operative and nonoperative management were discussed prior to surgery. Risks of the procedure including but not limited to the risk of infection, bleeding, injury to nerves, tendons, and surrounding structures, and anesthesia risks were discussed. Additional risks of persistent pain, fracture nonunion, fracture malunion, hardware failure, deep venous thrombosis, pulmonary embolism, need for further surgery, and loss of life or limb were also discussed. The patient indicated an understanding of these risks, benefits, alternatives, and possible complications and consented to the procedure. Operative Technique: The patient was identified in the preoperative holding area where the surgical site was marked with indelible marker. The patient was taken to the OR and general anesthetic was administered. Preoperative antibiotics were dosed and given.  The patient was transferred to the fracture table. A well-padded perineal post was placed. The operative foot was well-padded and placed into the foot newman. The contralateral leg was flexed and abducted on a well-padded crutch. A reduction maneuver was performed and there was improved alignment, although further reduction was required. The patient's right lower extremity was prepped and draped in the usual sterile fashion. A preoperative timeout was called and the correct patient, correct procedure, correct operative site, and correct surgeon were confirmed by all present. Incision was made on the proximal aspect of the lateral hip. Bovie electrocautery was used for hemostasis. Gluteal fascia was incised and the guide pin was inserted through the tip of the greater trochanter. Appropriate position was confirmed on both AP and lateral views. A ball spike pusher was then placed anteriorly over the proximal fragment and a bone hook was placed through a small lateral incision over the distal fragment in order to reduce the main fragments. Appropriate alignment was confirmed with AP and lateral fluoroscopic views. The opening reamer was then used over the guidepin. The nail was then placed down the femur to an appropriate position. The ball spike pusher maintained appropriate reduction and the guidepin for the lag screw was then placed and appropriate position in the femoral head and neck was confirmed on AP and lateral views. The pin was then measured and overdrilled. A 90 mm screw was selected and placed into the appropriate position. Traction was removed and the fracture was compressed. The screw was locked into position with the set screw. Appropriate lag screw placement was confirmed on AP and lateral views. The distal locked screw was placed through the targeting arm with good purchase. AP and lateral views showed maintained reduction, appropriate alignment, and appropriate hardware position. All counts were determined to be correct.  The wounds were thoroughly irrigated with normal saline. #1 Vicryl was used to close the fascia. 2-0 Vicryl was used to close the deep and subcutaneous tissue. Skin was closed with staples. 0.5% Marcaine was injected around the incisions for local anesthesia. Sterile dressing of Xeroform gauze, sterile gauze, and ABD pads was applied which was covered with tape. Anesthesia was reversed and the patient was brought to the PACU in stable condition having tolerated the procedure well. Daren Duong PA-C was present throughout the procedure and was integral in patient positioning, draping, surgical assisting, component placement, and wound closure. Post-op Plan/Instructions: The patient will be returned to the general medical-surgical unit. The patient will be weight bearing as tolerated on the operative extremity. Postoperative DVT prophylaxis and postoperative antibiotic prophylaxis will also be given. Follow-up in the office will be in 10-14 days for wound check and staple removal if appropriate. X-rays will be obtained at the first office visit.     Ani Sharma   Date: 10/23/2017  Time: 5:52 PM

## 2017-10-24 LAB
ANION GAP SERPL CALCULATED.3IONS-SCNC: 12 MEQ/L (ref 7–13)
BUN BLDV-MCNC: 25 MG/DL (ref 8–23)
CALCIUM SERPL-MCNC: 8.5 MG/DL (ref 8.6–10.2)
CHLORIDE BLD-SCNC: 101 MEQ/L (ref 98–107)
CO2: 24 MEQ/L (ref 22–29)
CREAT SERPL-MCNC: 0.71 MG/DL (ref 0.5–0.9)
GFR AFRICAN AMERICAN: >60
GFR NON-AFRICAN AMERICAN: >60
GLUCOSE BLD-MCNC: 121 MG/DL (ref 74–109)
HCT VFR BLD CALC: 32 % (ref 37–47)
HEMOGLOBIN: 10.6 G/DL (ref 12–16)
MCH RBC QN AUTO: 32.6 PG (ref 27–31.3)
MCHC RBC AUTO-ENTMCNC: 33.2 % (ref 33–37)
MCV RBC AUTO: 98.3 FL (ref 82–100)
PDW BLD-RTO: 13.1 % (ref 11.5–14.5)
PLATELET # BLD: 79 K/UL (ref 130–400)
POTASSIUM SERPL-SCNC: 4.3 MEQ/L (ref 3.5–5.1)
RBC # BLD: 3.26 M/UL (ref 4.2–5.4)
SODIUM BLD-SCNC: 137 MEQ/L (ref 132–144)
WBC # BLD: 7.7 K/UL (ref 4.8–10.8)

## 2017-10-24 PROCEDURE — 93010 ELECTROCARDIOGRAM REPORT: CPT | Performed by: INTERNAL MEDICINE

## 2017-10-24 PROCEDURE — 93005 ELECTROCARDIOGRAM TRACING: CPT

## 2017-10-24 PROCEDURE — 80048 BASIC METABOLIC PNL TOTAL CA: CPT

## 2017-10-24 PROCEDURE — 99232 SBSQ HOSP IP/OBS MODERATE 35: CPT | Performed by: INTERNAL MEDICINE

## 2017-10-24 PROCEDURE — 6360000002 HC RX W HCPCS: Performed by: NURSE PRACTITIONER

## 2017-10-24 PROCEDURE — 2580000003 HC RX 258: Performed by: NURSE PRACTITIONER

## 2017-10-24 PROCEDURE — 97110 THERAPEUTIC EXERCISES: CPT

## 2017-10-24 PROCEDURE — 85027 COMPLETE CBC AUTOMATED: CPT

## 2017-10-24 PROCEDURE — G8979 MOBILITY GOAL STATUS: HCPCS

## 2017-10-24 PROCEDURE — 97535 SELF CARE MNGMENT TRAINING: CPT

## 2017-10-24 PROCEDURE — 1210000000 HC MED SURG R&B

## 2017-10-24 PROCEDURE — 6370000000 HC RX 637 (ALT 250 FOR IP): Performed by: ORTHOPAEDIC SURGERY

## 2017-10-24 PROCEDURE — G8988 SELF CARE GOAL STATUS: HCPCS

## 2017-10-24 PROCEDURE — G8978 MOBILITY CURRENT STATUS: HCPCS

## 2017-10-24 PROCEDURE — G8987 SELF CARE CURRENT STATUS: HCPCS

## 2017-10-24 PROCEDURE — 6370000000 HC RX 637 (ALT 250 FOR IP): Performed by: PHYSICIAN ASSISTANT

## 2017-10-24 PROCEDURE — 6360000002 HC RX W HCPCS: Performed by: ORTHOPAEDIC SURGERY

## 2017-10-24 PROCEDURE — 36415 COLL VENOUS BLD VENIPUNCTURE: CPT

## 2017-10-24 PROCEDURE — 97167 OT EVAL HIGH COMPLEX 60 MIN: CPT

## 2017-10-24 PROCEDURE — 97162 PT EVAL MOD COMPLEX 30 MIN: CPT

## 2017-10-24 PROCEDURE — 99231 SBSQ HOSP IP/OBS SF/LOW 25: CPT | Performed by: NURSE PRACTITIONER

## 2017-10-24 RX ADMIN — LACTOBACILLUS TAB 1 TABLET: TAB at 21:38

## 2017-10-24 RX ADMIN — ENOXAPARIN SODIUM 30 MG: 100 INJECTION SUBCUTANEOUS at 09:14

## 2017-10-24 RX ADMIN — LACTOBACILLUS TAB 1 TABLET: TAB at 09:14

## 2017-10-24 RX ADMIN — SENNOSIDES AND DOCUSATE SODIUM 1 TABLET: 8.6; 5 TABLET ORAL at 09:14

## 2017-10-24 RX ADMIN — Medication 5 MG: at 21:38

## 2017-10-24 RX ADMIN — CYCLOBENZAPRINE HYDROCHLORIDE 5 MG: 10 TABLET, FILM COATED ORAL at 06:42

## 2017-10-24 RX ADMIN — ACETAMINOPHEN 650 MG: 325 TABLET ORAL at 13:25

## 2017-10-24 RX ADMIN — CEFAZOLIN SODIUM 2 G: 2 SOLUTION INTRAVENOUS at 09:14

## 2017-10-24 RX ADMIN — DOXYCYCLINE HYCLATE 100 MG: 100 CAPSULE, GELATIN COATED ORAL at 09:14

## 2017-10-24 RX ADMIN — CEFAZOLIN SODIUM 2 G: 2 SOLUTION INTRAVENOUS at 00:10

## 2017-10-24 RX ADMIN — SPIRONOLACTONE 25 MG: 25 TABLET, FILM COATED ORAL at 09:14

## 2017-10-24 RX ADMIN — DOCUSATE SODIUM 100 MG: 100 CAPSULE, LIQUID FILLED ORAL at 09:14

## 2017-10-24 RX ADMIN — DOCUSATE SODIUM 100 MG: 100 CAPSULE, LIQUID FILLED ORAL at 21:38

## 2017-10-24 RX ADMIN — POLYETHYLENE GLYCOL 3350 17 G: 17 POWDER, FOR SOLUTION ORAL at 09:15

## 2017-10-24 RX ADMIN — SODIUM CHLORIDE: 9 INJECTION, SOLUTION INTRAVENOUS at 21:39

## 2017-10-24 ASSESSMENT — PAIN DESCRIPTION - PAIN TYPE: TYPE: SURGICAL PAIN;ACUTE PAIN

## 2017-10-24 ASSESSMENT — ENCOUNTER SYMPTOMS: SHORTNESS OF BREATH: 0

## 2017-10-24 ASSESSMENT — PAIN DESCRIPTION - FREQUENCY: FREQUENCY: CONTINUOUS

## 2017-10-24 ASSESSMENT — PAIN SCALES - GENERAL
PAINLEVEL_OUTOF10: 5
PAINLEVEL_OUTOF10: 5
PAINLEVEL_OUTOF10: 0

## 2017-10-24 ASSESSMENT — PAIN DESCRIPTION - DESCRIPTORS: DESCRIPTORS: SORE

## 2017-10-24 ASSESSMENT — PAIN DESCRIPTION - LOCATION: LOCATION: HIP

## 2017-10-24 NOTE — PROGRESS NOTES
Score  Intervention List: Patient able to continue with treatment    Pain Screening  Patient Currently in Pain: No     Pain Reassessment:   Pain Assessment  Pain Assessment: 0-10  Pain Level: 0       Orientation WNL       Objective   Bed mobility  Supine to Sit: Maximum assistance;Dependent/Total  Sit to Supine: Maximum assistance;Dependent/Total  Comment: +2 for safety with bed flat and use of bedrails to the (L) side of the bed. Tactile and VCs with max sequencing with c-collar already on. Pt positioned BTB with Hip abd pillowbtw knees    Transfers  Sit to Stand: Maximum Assistance  Stand to sit: Maximum Assistance  Comment: +2 for safety at [employer] with max cues and fair follow thru with pillow between knees and at [employer]  Stood x 3      Ambulation  Ambulation?: No       Balance  Sitting - Static: Fair (sitting EOB)  Sitting - Dynamic: Fair (sitting EOB reaching outside of RAUL)  Standing - Static: Poor;+ (at [employer])  Exercises  Quad Sets: x10  Gluteal Sets: x10  Ankle Pumps: x10  Comments: with assist and cues.   Written HEP given to pt         Cryotherapy (Minutes\Location): ice PRN for pain           Assessment   Activity Tolerance  Activity Tolerance: Patient Tolerated treatment well     Discharge Recommendations:  Continue to assess pending progress, Patient would benefit from continued therapy after discharge    Goals  Short term goals  Short term goal 1: Min A with bed mobility  Short term goal 2: R hip PROM to neutral  Short term goal 3: sit <>stand with arleth steady x 5 reps with Min A  Short term goal 4: seated and supine HEP to improve R LE ROM and strength  Short term goal 5: seated functional reaching activities OOBOS with no LOB   Long term goals  Long term goal 1: Sit<>stand with 2ww with Mod A  Long term goal 2: functional transfers with Mod A   Long term goal 3: standing with 2ww >1 minutes with Min A  Long term goal 4: to be assessed for gait when appropriate  Patient Goals   Patient goals : Juaquin Nails

## 2017-10-24 NOTE — PROGRESS NOTES
Hip surgery    Progress Note    Subjective:     Post-Operative Day: 1 Status Post right TFN  Systemic or Specific Complaints:No Complaints    Objective:     CURRENT VITALS:  BP (!) 118/48   Pulse 91   Temp 97.5 °F (36.4 °C) (Oral)   Resp 16   Wt 148 lb (67.1 kg)   SpO2 99%   BMI 24.63 kg/m²     General: alert, appears stated age and cooperative   Wound: Wound clean and dry no evidence of infection. Motion: Painful range of Motion   DVT Exam: No evidence of DVT seen on physical exam.       NVI in lower extremity. Thigh swollen but soft. Moving foot and ankle. Data Review  Recent Labs      10/22/17   0229  10/23/17   0524  10/24/17   0519   WBC  4.9  6.4  7.7   RBC  3.98*  3.52*  3.26*   HGB  13.0  11.6*  10.6*   HCT  38.9  34.5*  32.0*   MCV  97.7  98.1  98.3   MCH  32.7*  33.0*  32.6*   MCHC  33.5  33.7  33.2   RDW  13.3  13.2  13.1   PLT  92*  83*  79*       Assessment:     Status Post right TFN. Doing well postoperatively. Pt did not require much pain medication through the night- but states she has some cramping in her toes. Minimal acute blood loss acnemia with decreased output    Plan:      1: Continues current post-op course :use tramadol and flexeril. Keep todd in as patient only has 250ml out.  Will increase fluids and continue I&O  2:  Continue Deep venous thrombosis prophylaxis   3:  Continue physical therapy- pt/ ot eval and placement  4:  Continue Pain Control

## 2017-10-24 NOTE — PROGRESS NOTES
Physical Therapy Med Surg Initial Assessment  Facility/Department: [facility]  Room: N223/N223-       NAME: Yon Arteaga  : 1924 (80 y.o.)  MRN: [id number]  CODE STATUS: Full Code    Date of Service: 10/24/2017    Patient Diagnosis(es): Hip fracture requiring operative repair, right, closed, initial encounter [facility]) Yudith Mcgrath   Chief Complaint   Patient presents with    Fall    Hip Pain     right    Knee Pain     right     Patient Active Problem List    Diagnosis Date Noted    Closed right hip fracture ([address] 10/22/2017    Infected prosthetic knee joint ([address]     Right knee skin infection 2016    Thrombocytopenia ([address] 2015    Back pain     HTN (hypertension)     Osteoporosis     Peripheral neuropathy ([address]     Tubular adenoma     Pacemaker         Past Medical History:   Diagnosis Date    Back pain     Constipation     Diverticulosis     DVT (deep venous thrombosis) ([address]     H/O echocardiogram     EF normal    Hearing loss     Heart block AV third degree (HCC)     HTN (hypertension)     Osteoporosis     Pacemaker     Peripheral neuropathy ([address]     Pulmonary hypertension     Pulmonary nodule, left     Thrombocytopenia (HCC)     Tricuspid regurgitation     Tubular adenoma 2010     Past Surgical History:   Procedure Laterality Date    COLONOSCOPY      declines further-hx adenoma    HYSTERECTOMY  1965    NO CANCER    SHOULDER ARTHROPLASTY      right     TOTAL KNEE ARTHROPLASTY  2008    right       Chart Reviewed: Yes  Patient assessed for rehabilitation services?: Yes  Family / Caregiver Present: No    Restrictions:  Restrictions/Precautions: Weight Bearing, Fall Risk  Lower Extremity Weight Bearing Restrictions  Right Lower Extremity Weight Bearing: Weight Bearing As Tolerated  Required Braces or Orthoses  Cervical: c-collar  Position Activity Restriction  Other position/activity restrictions: no hip precautions  Body mass index is 24.63 kg/m². SUBJECTIVE: Subjective: \"I have been having the weirdest dreams. \"  Pre Treatment Pain Screening  Pain at present: 0  Scale Used: Numeric Score  Intervention List: Pt educated regarding timing of pain meds; Patient able to continue with treatment;Patient declined any intervention    Post Treatment Pain Screening:   Pain Screening  Patient Currently in Pain: Yes  Pain Assessment  Pain Assessment: 0-10  Pain Level: 5  Pain Type: Surgical pain;Acute pain  Pain Location: Hip  Pain Descriptors: Sore  Pain Frequency: Continuous    Prior Level of Function:  Social/Functional History  Lives With: Alone  Type of Home: House  Home Layout: One level  Home Access: Level entry  Bathroom Shower/Tub: Walk-in shower  Bathroom Toilet: Handicap height  Bathroom Equipment: Grab bars in shower, Shower chair  Bathroom Accessibility: Wheelchair accessible  Home Equipment: 4 wheeled walker, Rolling walker, [employer]  Receives Help From: Home health  ADL Assistance: Independent  Homemaking Assistance: Needs assistance  Homemaking Responsibilities: Yes  Ambulation Assistance: Independent (rollator)  Transfer Assistance: Independent  Active : No  Additional Comments: recently moved in to a handicap accessible home    OBJECTIVE:   Vision/Hearing:  Vision: Within Functional Limits  Hearing: Within functional limits    Cognition:  Overall Orientation Status: Impaired  Orientation Level: Oriented X4  Follows Commands: Within Functional Limits    Observation/Palpation  Observation: no acute distress, supine in bed with HOB elevated, R hip adducted and IR    ROM:  RLE General PROM: able to achieve neutral hip abductiona and ER; knee lacking ~15 degrees extension  RLE General AROM: decreased hip ER  and Abduction:unable to achieve neutral position, Knee lacking ~20 degrees extension, ankle WNL  LLE PROM: WFL  LLE AROM : WFL    Strength:  Strength RLE  Comment: 2+/5 functionally assessed  Strength LLE  Comment: >3+/5 functionally assessed    Neuro:  Balance  Sitting - Static: Fair (1-2 UE support, sat EOB 5 minutes)  Sitting - Dynamic: Fair (sat EOB, functional reaching limited to within RAUL, 1-2 UE support )  Standing - Static: Poor  Standing - Dynamic: Poor     Motor Control  Gross Motor?: Exceptions  Comments: limited R LE PROM and AROM, decreased R LE strength  Sensation  Overall Sensation Status: WFL    Bed mobility  Supine to Sit: Dependent/Total  Scooting: Dependent/Total  Comment: 2 person assist for long sit pivot to EOB from supine with HOB elevated; VC and tactile cues with max sequencing; c-collar donned prior to mobility    Transfers  Sit to Stand: Dependent/Total  Stand to sit: Dependent/Total  Squat Pivot Transfers: Dependent/Total  Comment: Increased time to improve R LE positioning to neutral prior to mobility. 2 person assist for all transfers. 2 trials of sit<>Stand: 1 trial with 2ww and 1 trial with OT in front and PT assisting with positioning of R LE. Max verbal cues for sequencing. Ambulation  Ambulation?: No (NT- safety concern)    Activity Tolerance  Activity Tolerance: Patient Tolerated treatment well    Exercises  Comments: instructed in anti embolics     ASSESSMENT:   Body structures, Functions, Activity limitations: Decreased functional mobility ; Decreased safe awareness;Decreased balance;Decreased ROM; Decreased strength;Decreased endurance;Decreased coordination  Decision Making: Medium Complexity  History: high  Exam: high  Clinical Presentation: evolving    Prognosis: Good  Patient Education: Pt educated in role of acute care PT, PT POC, benefits of mobility while in house, safety techniques, use of call light for assistance, d/c planning, d/c recommendation.   , WBAT, c-collar, LE positioning, anti embolics  Barriers to Learning: none    DISCHARGE RECOMMENDATIONS:  Discharge Recommendations: Continue to assess pending progress, Patient would benefit from continued therapy after discharge    Assessment: Pt demonstrates the above deficits. Pt would benefit from skilled physical therapy while in house to prevent decline in function. Pt would also benefit from post acute skilled physical therapy to improve safety & independence with all functional mobility, improve strength, increase ROM, improve standing balance, improve endurance to allow for decrease risk for falls, return to previous level of function, and safe return to home with/out assistance. REQUIRES PT FOLLOW UP: Yes      PLAN OF CARE:  Plan  Times per week: 7  Times per day: Twice a day  Current Treatment Recommendations: Functional Mobility Training, Strengthening, ROM, Transfer Training, Balance Training, Stair training, Gait Training, Neuromuscular Re-education, Home Exercise Program, Safety Education & Training, Patient/Caregiver Education & Training, Positioning, Equipment Evaluation, Education, & procurement  Safety Devices  Type of devices: All fall risk precautions in place    G-Code:  PT G-Codes  Functional Assessment Tool Used: clinical judgement  Functional Limitation: Mobility: Walking and moving around  Mobility: Walking and Moving Around Current Status (): 100 percent impaired, limited or restricted  Mobility: Walking and Moving Around Goal Status ():  At least 60 percent but less than 80 percent impaired, limited or restricted    Goals:  Patient goals : \"Walk again\"  Short term goals  Short term goal 1: Min A with bed mobility  Short term goal 2: R hip PROM to neutral  Short term goal 3: sit <>stand with arleth steady x 5 reps with Min A  Short term goal 4: seated and supine HEP to improve R LE ROM and strength  Short term goal 5: seated functional reaching activities OOBOS with no LOB   Long term goals  Long term goal 1: Sit<>stand with 2ww with Mod A  Long term goal 2: functional transfers with Mod A   Long term goal 3: standing with 2ww >1 minutes with Min A  Long term goal 4: to be assessed for gait when appropriate    Therapy Time:   Individual   Time In 0837   Time Out 0915   Minutes 38         23 minutes for transfers/bed mobility  Demi Vega, PT, 10/24/17 at 9:37 AM

## 2017-10-24 NOTE — FLOWSHEET NOTE
mepiplex applied to sacrum and kenny heels.   Electronically signed by Candance Dart, RN on 10/24/2017 at 5:30 PM

## 2017-10-24 NOTE — PROGRESS NOTES
[] Tub/Shower combo   [x]  Shower stall    Location: in apt    DME: [x] W/W   [] [employer]   [] Rollator   []  W/C   [x] [place]   [x] Shower Chair   [] [facility]  [] Dressing  AE  [] Other:      Previous Functional Status:   Ind with [employer] meal prep and ADLs    Pain:   Start of session: 5/10  Location: R hip  Description: hurts  Action: [] No Action Necessary    [x] Patient reports pain at acceptable level for treatment  [] Nursing notified    [] Other      Objective:  Observation:  Alert,cooperative    Orientation: Oriented to  [x] Person   [x] Place  [x]Time    Vision:   [x]  WFL   [] Impaired  Comments:  glasses    Hearing:  [] WFL   [x] Impaired  Comments:  [place]  Sensation:   [x] WFL   []  Impaired   Comments:  High pain tolerance    Cognition:   [x] WFL   [] Impaired  Comments:    Communication:   [x] WFL   [] Impaired  Comments:    Range of Motion:  R UE AROM/PROM: [x]  WFL [] Impaired  Comments:   L UE AROM/PROM:  [x]  WFL [] Impaired  Comments:     Strength:   R UE Strength: []1    [] 2   [] 2+   [] 3   [x] 3+   [] 4   [] 4+  [] 5  Comments:   L UE Strength:  []1    [] 2   [] 2+   [] 3   [x] 3+  [] 4   [] 4+   [] 5  Comments:     Quality of Movement:  [x] Good   [] Fair   [] Poor     Coordination:  R shld  Gross motor: [] WFL   [x] Impaired   Fine motor: [x] WFL   [] Impaired     Functional Mobility:  Toilet Transfers:  Max A with stand pivot to [facility]  Bed Transfer:Dep supine to sit  Sit to stand: Dep  Bed to Chair:  Dep    Seated Balance:      Static: [x] Good  [] Fair   [] Poor   Dynamic: []  Good  [x] Fair   [] Poor     Standing Balance:     Static: [] Good   [] Fair  [x] Poor   Dynamic: [] Good   [] Fair   [x] Poor     Functional Endurance: [] Good  [x] Fair  [] Poor     ADLs  Feeding:  Set up  UE Dressing:  Min A  LB Dressing:  Dep  Bathing:  Max A simulated  Toileting: Vang Max A with BM  Grooming: Set up seated    Goals:   Patient will:   [x]  Improve functional endurance to tolerate/complete 30 mins of

## 2017-10-24 NOTE — CARE COORDINATION
10/24/17 I MET WITH THE PT. SHE CONFIRMED SHE WOULD LIKE TO GO TO [address] [place] [address]. SHE STATED SHE SPOKE WITH THE [facility] THIS AM AND HAS THE [address] [place] [address] LITERATURE IN HER HAND NOW. JUANIS DELGADILLO REQUEST TO [facility]  PT SECOND CHOICE IS [facility] AND THIRD CHOICE IS [facility].

## 2017-10-24 NOTE — PROGRESS NOTES
Jeannie Diaz is a 80 y.o. female patient.  Pt was seen and evaluated, POD #1, tolerated the surgery well, no new complaints, no overnight events    Current Facility-Administered Medications   Medication Dose Route Frequency Provider Last Rate Last Dose    enoxaparin (LOVENOX) injection 30 mg  30 mg Subcutaneous Daily Darlin Hawley, CNP   30 mg at 10/24/17 0914    0.9 % sodium chloride infusion   Intravenous Continuous Darlin Hawley,  mL/hr at 10/24/17 5567      sodium chloride flush 0.9 % injection 10 mL  10 mL Intravenous 2 times per day Daily Aguilar MD        sodium chloride flush 0.9 % injection 10 mL  10 mL Intravenous PRN Daily Aguilar MD        acetaminophen (TYLENOL) tablet 650 mg  650 mg Oral Q4H PRN Daily Aguilar MD   650 mg at 10/24/17 1325    oxyCODONE-acetaminophen (PERCOCET) 5-325 MG per tablet 1 tablet  1 tablet Oral Q4H PRN Daily Aguilar MD        docusate sodium (COLACE) capsule 100 mg  100 mg Oral BID Daily Aguilar MD   100 mg at 10/24/17 0914    magnesium hydroxide (MILK OF MAGNESIA) 400 MG/5ML suspension 30 mL  30 mL Oral Daily PRN Daily Aguilar MD        sennosides-docusate sodium (SENOKOT-S) 8.6-50 MG tablet 1 tablet  1 tablet Oral Daily Daily Aguilar MD   1 tablet at 10/24/17 0914    ondansetron (ZOFRAN) injection 4 mg  4 mg Intravenous Q6H PRN Daily Aguilar MD        traMADol Azzie Salvage) tablet 50 mg  50 mg Oral Q4H PRN Daily Aguilar MD        sodium chloride flush 0.9 % injection 3 mL  3 mL Intravenous Q8H Alric Irasema PA-C   3 mL at 10/23/17 0437    cyclobenzaprine (FLEXERIL) tablet 5 mg  5 mg Oral BID PRN Wilbert Collins PA-C   5 mg at 10/24/17 3421    docusate sodium (COLACE) capsule 100 mg  100 mg Oral Daily JADON Lucero-C   100 mg at 10/22/17 2823    lactobacillus acidophilus [facility]) 1 tablet  1 tablet Oral TID Wilbert Collins PA-C   1 tablet at 10/24/17 0914    melatonin tablet 5 mg  5 mg Oral Nightly Petra Powell PA-C   5 mg at 10/24/2017    BUN 25 10/24/2017    CREATININE 0.71 10/24/2017    GLUCOSE 121 10/24/2017    GLUCOSE 193 05/05/2012    CALCIUM 8.5 10/24/2017      Past Medical History:   Diagnosis Date    Back pain     Constipation     Diverticulosis     DVT (deep venous thrombosis) (HCC)     H/O echocardiogram     EF normal    Hearing loss     Heart block AV third degree (HCC)     HTN (hypertension)     Osteoporosis     Pacemaker     Per[facility]     Pulmonary hypertension     Pulmonary nodule, left     Thrombocytopenia (HCC)     Tricuspid regurgitation     Tubular adenoma 2010         Assessment & Plan  1. S/p fall unk mechanism vs syncope  No sensation of passing out  2. R hip fracture  S/p right TFN  Pt is high risk  3. Chronic R knee prosthetic infection  4.  HTN  stable  5)  Displaced otontoid fx  FU neurosurgery eval  6) chronic thrombocytopenia monitor     Gareth Hyman MD  10/24/2017

## 2017-10-24 NOTE — PROGRESS NOTES
Progress Note  Patient: Mary Acuña  Unit/Bed: J759/G024-72  YOB: 1924  MRN: [id number]  Acct: [de-identified]   Admitting Diagnosis: Hip fracture requiring operative repair, right, closed, initial encounter [facility]) Diandra Jacobs Date:  10/22/2017  Hospital Day: 2    Chief Complaint:  S/p right hip repair    Subjective  80year-old female who is unable to answer any questions at this time the majority of the information is received from the chart and from the nurse. Apparently the patient had right knee surgery a couple weeks ago was at home recovering got up this morning to use the bathroom has a right hip fracture that currently asked us to get involved for preop clearance. Again the patient is sedated unable to answer any questions at that this time responds only painful stimuli. According to the history it looks like she has a pacemaker, hypertension, thrombocytopenia. Thethat they also checked her C-spine and she had a questionable C2 fracture get a stat MRI now.     10/24/17  Patient awake confused at times. Tele paced. Review of Systems:   Review of Systems   Constitutional: Negative for fatigue. Respiratory: Negative for shortness of breath. Cardiovascular: Negative for chest pain and leg swelling. Physical Examination:    BP (!) 119/47   Pulse 88   Temp 97.3 °F (36.3 °C) (Oral)   Resp 18   Wt 148 lb (67.1 kg)   SpO2 100%   BMI 24.63 kg/m²    Physical Exam   Neck: No JVD present. Cardiovascular: Normal rate and regular rhythm. Murmur heard. Abdominal: Soft. Bowel sounds are normal.   Musculoskeletal: She exhibits no edema. Right hip dressing clean and dry   Skin: Skin is warm and dry.        LABS:  CBC:   Lab Results   Component Value Date    WBC 7.7 10/24/2017    RBC 3.26 10/24/2017    RBC 3.30 05/04/2012    HGB 10.6 10/24/2017    HCT 32.0 10/24/2017    MCV 98.3 10/24/2017    MCH 32.6 10/24/2017    MCHC 33.2 10/24/2017    RDW 13.1 10/24/2017    PLT 79 10/24/2017

## 2017-10-25 ENCOUNTER — APPOINTMENT (OUTPATIENT)
Dept: GENERAL RADIOLOGY | Age: 82
DRG: 481 | End: 2017-10-25
Payer: MEDICARE

## 2017-10-25 LAB
ANION GAP SERPL CALCULATED.3IONS-SCNC: 9 MEQ/L (ref 7–13)
BUN BLDV-MCNC: 25 MG/DL (ref 8–23)
CALCIUM SERPL-MCNC: 8.1 MG/DL (ref 8.6–10.2)
CHLORIDE BLD-SCNC: 103 MEQ/L (ref 98–107)
CO2: 26 MEQ/L (ref 22–29)
CREAT SERPL-MCNC: 0.5 MG/DL (ref 0.5–0.9)
GFR AFRICAN AMERICAN: >60
GFR NON-AFRICAN AMERICAN: >60
GLUCOSE BLD-MCNC: 122 MG/DL (ref 74–109)
HCT VFR BLD CALC: 26.2 % (ref 37–47)
HEMOGLOBIN: 8.7 G/DL (ref 12–16)
MCH RBC QN AUTO: 33 PG (ref 27–31.3)
MCHC RBC AUTO-ENTMCNC: 33.1 % (ref 33–37)
MCV RBC AUTO: 99.9 FL (ref 82–100)
PDW BLD-RTO: 13.3 % (ref 11.5–14.5)
PLATELET # BLD: 71 K/UL (ref 130–400)
POTASSIUM SERPL-SCNC: 4.4 MEQ/L (ref 3.5–5.1)
RBC # BLD: 2.62 M/UL (ref 4.2–5.4)
SODIUM BLD-SCNC: 138 MEQ/L (ref 132–144)
WBC # BLD: 7.6 K/UL (ref 4.8–10.8)

## 2017-10-25 PROCEDURE — 93010 ELECTROCARDIOGRAM REPORT: CPT | Performed by: INTERNAL MEDICINE

## 2017-10-25 PROCEDURE — 6370000000 HC RX 637 (ALT 250 FOR IP): Performed by: ORTHOPAEDIC SURGERY

## 2017-10-25 PROCEDURE — 2580000003 HC RX 258: Performed by: NURSE PRACTITIONER

## 2017-10-25 PROCEDURE — 6360000002 HC RX W HCPCS: Performed by: NURSE PRACTITIONER

## 2017-10-25 PROCEDURE — 72040 X-RAY EXAM NECK SPINE 2-3 VW: CPT

## 2017-10-25 PROCEDURE — 36415 COLL VENOUS BLD VENIPUNCTURE: CPT

## 2017-10-25 PROCEDURE — 80048 BASIC METABOLIC PNL TOTAL CA: CPT

## 2017-10-25 PROCEDURE — 85027 COMPLETE CBC AUTOMATED: CPT

## 2017-10-25 PROCEDURE — 2700000000 HC OXYGEN THERAPY PER DAY

## 2017-10-25 PROCEDURE — 93005 ELECTROCARDIOGRAM TRACING: CPT

## 2017-10-25 PROCEDURE — 97535 SELF CARE MNGMENT TRAINING: CPT

## 2017-10-25 PROCEDURE — 6370000000 HC RX 637 (ALT 250 FOR IP): Performed by: PHYSICIAN ASSISTANT

## 2017-10-25 PROCEDURE — 1210000000 HC MED SURG R&B

## 2017-10-25 RX ORDER — TRAMADOL HYDROCHLORIDE 50 MG/1
50 TABLET ORAL EVERY 6 HOURS PRN
Qty: 30 TABLET | Refills: 0 | Status: SHIPPED | OUTPATIENT
Start: 2017-10-25 | End: 2017-10-27 | Stop reason: SDUPTHER

## 2017-10-25 RX ORDER — ACETAMINOPHEN 325 MG/1
650 TABLET ORAL EVERY 4 HOURS PRN
Qty: 120 TABLET | Refills: 0
Start: 2017-10-25 | End: 2017-10-27 | Stop reason: SDUPTHER

## 2017-10-25 RX ORDER — CYCLOBENZAPRINE HCL 5 MG
5 TABLET ORAL 2 TIMES DAILY PRN
Qty: 30 TABLET | Refills: 0
Start: 2017-10-25 | End: 2017-10-27 | Stop reason: SDUPTHER

## 2017-10-25 RX ADMIN — LACTOBACILLUS TAB 1 TABLET: TAB at 13:54

## 2017-10-25 RX ADMIN — DOCUSATE SODIUM 100 MG: 100 CAPSULE, LIQUID FILLED ORAL at 08:40

## 2017-10-25 RX ADMIN — LACTOBACILLUS TAB 1 TABLET: TAB at 08:41

## 2017-10-25 RX ADMIN — SODIUM CHLORIDE: 9 INJECTION, SOLUTION INTRAVENOUS at 05:26

## 2017-10-25 RX ADMIN — Medication 5 MG: at 21:58

## 2017-10-25 RX ADMIN — DOCUSATE SODIUM 100 MG: 100 CAPSULE, LIQUID FILLED ORAL at 21:59

## 2017-10-25 RX ADMIN — ENOXAPARIN SODIUM 30 MG: 100 INJECTION SUBCUTANEOUS at 08:40

## 2017-10-25 RX ADMIN — SENNOSIDES AND DOCUSATE SODIUM 1 TABLET: 8.6; 5 TABLET ORAL at 08:41

## 2017-10-25 RX ADMIN — ACETAMINOPHEN 650 MG: 325 TABLET ORAL at 13:53

## 2017-10-25 RX ADMIN — SODIUM CHLORIDE: 9 INJECTION, SOLUTION INTRAVENOUS at 21:58

## 2017-10-25 RX ADMIN — POLYETHYLENE GLYCOL 3350 17 G: 17 POWDER, FOR SOLUTION ORAL at 08:40

## 2017-10-25 RX ADMIN — ACETAMINOPHEN 650 MG: 325 TABLET ORAL at 08:41

## 2017-10-25 RX ADMIN — DOXYCYCLINE HYCLATE 100 MG: 100 CAPSULE, GELATIN COATED ORAL at 08:40

## 2017-10-25 RX ADMIN — SPIRONOLACTONE 25 MG: 25 TABLET, FILM COATED ORAL at 08:41

## 2017-10-25 RX ADMIN — LACTOBACILLUS TAB 1 TABLET: TAB at 21:59

## 2017-10-25 ASSESSMENT — PAIN SCALES - GENERAL
PAINLEVEL_OUTOF10: 1
PAINLEVEL_OUTOF10: 0
PAINLEVEL_OUTOF10: 3
PAINLEVEL_OUTOF10: 0
PAINLEVEL_OUTOF10: 0

## 2017-10-25 NOTE — PROGRESS NOTES
Infectious Disease Progress Note    10/24/2017    Patient is a hospital followup regarding chronic suppression of the R knee and now recent right hip fracture. Feels ok. Subjectively, no new complaints at this time. General: Patient in no acute distress, cooperative, sleepy at the present time. Skin: no new rashes   HEENT: EOMI, MMM, Neck is supple  Heart: S1 S2  Lungs:bilaterally clear  Abdomen: soft, ND, +BS, NTTP  Extrem:+pulses, no calf pain  Neuro exam: CN II-XII intact      Lab Results   Component Value Date    WBC 7.7 10/24/2017    HGB 10.6 (L) 10/24/2017    HCT 32.0 (L) 10/24/2017    MCV 98.3 10/24/2017    PLT 79 (L) 10/24/2017     Lab Results   Component Value Date     10/24/2017    K 4.3 10/24/2017     10/24/2017    CO2 24 10/24/2017    BUN 25 10/24/2017    CREATININE 0.71 10/24/2017    GLUCOSE 121 10/24/2017    GLUCOSE 193 05/05/2012    CALCIUM 8.5 10/24/2017        WBC trends are being monitored. Antibiotic doses are being adjusted per most recent renal labs. Vitals:    10/24/17 2345   BP: (!) 125/48   Pulse: 87   Resp: 18   Temp: 98.1 °F (36.7 °C)   SpO2: 100%           Patient Active Problem List   Diagnosis    Back pain    HTN (hypertension)    Osteoporosis    Peripheral neuropathy (HCC)    Tubular adenoma    Pacemaker    Thrombocytopenia (HCC)    Right knee skin infection    Infected prosthetic knee joint (HCC)    Closed right hip fracture (HCC)           ASSESSMENT and PLAN:    From an ID standpoint, will continue treatment for chronic infected right knee and new right hip fracture without evidence of infection    Continue Doxycycline      Imaging and labs were reviewed per medical records and any ID pertinent labs were addressed with the patient.          Tamika Booth DO

## 2017-10-25 NOTE — CARE COORDINATION
IVONE spoke with Karla Huff at [facility] and as of the time of this note there is no authorization from [facility] for pt to transfer to [facility] today.

## 2017-10-25 NOTE — PROGRESS NOTES
Physical Therapy Med Surg Daily Treatment Note  [facility]Department[facility]  Room: N223/N223-       NAME: Noelle Betancourt  : 1924 (80 y.o.)  MRN: [id number]  CODE STATUS: Full Code    Date of Service: 10/25/2017    Patient Diagnosis(es): Hip fracture requiring operative repair, right, closed, [facility]   Chief Complaint   Patient presents with    Fall    Hip Pain     right    Knee Pain     right     Patient Active Problem List    Diagnosis Date Noted    Closed fracture of right hip ([address] 10/22/2017    Infection of prosthetic right knee joint ([address]     Right knee skin infection 2016    Thrombocytopenia ([address] 2015    Back pain     HTN (hypertension)     Osteoporosis     Peripheral neuropathy ([address]     Tubular adenoma     Pacemaker         Past Medical History:   Diagnosis Date    Back pain     Constipation     Diverticulosis     DVT (deep venous thrombosis) ([address]     H/O echocardiogram     EF normal    Hearing loss     Heart block AV third degree ([address]     HTN (hypertension)     Osteoporosis     Pacemaker     Peripheral neuropathy ([address]     Pulmonary hypertension     Pulmonary nodule, left     Thrombocytopenia (HCC)     Tricuspid regurgitation     Tubular adenoma 2010     Past Surgical History:   Procedure Laterality Date    COLONOSCOPY      declines further-hx adenoma    HYSTERECTOMY  1965    NO CANCER    SHOULDER ARTHROPLASTY      right     TOTAL KNEE ARTHROPLASTY  2008    right         Restrictions  Restrictions/Precautions: Weight Bearing, Fall Risk  Lower Extremity Weight Bearing Restrictions  Right Lower Extremity Weight Bearing: Weight Bearing As Tolerated  Required Braces or Orthoses  Cervical: c-collar (PRN for comfort and pt declined using it today)  Position Activity Restriction  Other position/activity restrictions: no hip precautions    Subjective   General  Chart Reviewed: Yes  Family / Caregiver Present: No  Pre Treatment Pain Screening  Pain at present: 0  Scale Used: Numeric Score  Intervention List: Patient able to continue with treatment    Pain Screening  Patient Currently in Pain: Denies     Pain Reassessment:   Pain Assessment  Pain Assessment: 0-10  Pain Level: 0       Orientation  Orientation  Overall Orientation Status: Within Normal Limits    Objective   Bed mobility  Rolling to Left: Maximum assistance  Rolling to Right: Maximum assistance  Supine to Sit: Maximum assistance  Sit to Supine: Dependent/Total  Comment: +2 for safety with cues and minimal follow thru. Bed flat and use of bedrails    Transfers  Sit to Stand: Maximum Assistance  Stand to sit: Maximum Assistance  Comment: +2 for safety at [employer] with max cues and fair follow thru  (R LE brace - pt able to stand more erect)    Ambulation  Ambulation?: No       Balance  Standing - Static: Poor  Standing - Dynamic: Poor  Comments: at [employer] pt able to stand more tall with decreased flex 2* RLE brace    Pt stood x 1 min with max A at [employer]    BTB with brace on x 1 hr and then to be removed and abd pillow btw knees. Nsg notified                    Assessment   Activity Tolerance  Activity Tolerance: Patient limited by endurance; Patient Tolerated treatment well     Discharge Recommendations:  Continue to assess pending progress, Patient would benefit from continued therapy after discharge    Goals  Short term goals  Short term goal 1: Min A with bed mobility  Short term goal 2: R hip PROM to neutral  Short term goal 3: sit <>stand with arleth steady x 5 reps with Min A  Short term goal 4: seated and supine HEP to improve R LE ROM and strength  Short term goal 5: seated functional reaching activities OOBOS with no LOB   Long term goals  Long term goal 1: Sit<>stand with 2ww with Mod A  Long term goal 2: functional transfers with Mod A   Long term goal 3: standing with 2ww >1 minutes with Min A  Long term goal 4: to be assessed for gait when appropriate  Patient Goals

## 2017-10-25 NOTE — PROGRESS NOTES
Clementine Johnston is a 80 y.o. female patient.  Pt was seen and evaluated, POD #2,, no new complaints, no overnight events    Current Facility-Administered Medications   Medication Dose Route Frequency Provider Last Rate Last Dose    enoxaparin (LOVENOX) injection 30 mg  30 mg Subcutaneous Daily Lulú Lever, CNP   30 mg at 10/25/17 0840    0.9 % sodium chloride infusion   Intravenous Continuous Lulú Lever,  mL/hr at 10/25/17 0526      sodium chloride flush 0.9 % injection 10 mL  10 mL Intravenous 2 times per day Jojo Acevedo MD   Stopped at 10/25/17 0841    sodium chloride flush 0.9 % injection 10 mL  10 mL Intravenous PRN Jojo Acevedo MD        acetaminophen (TYLENOL) tablet 650 mg  650 mg Oral Q4H PRN Jojo Acevedo MD   650 mg at 10/25/17 0841    oxyCODONE-acetaminophen (PERCOCET) 5-325 MG per tablet 1 tablet  1 tablet Oral Q4H PRN Jojo Acevedo MD        docusate sodium (COLACE) capsule 100 mg  100 mg Oral BID Jojo Acevedo MD   Stopped at 10/25/17 1224    magnesium hydroxide (MILK OF MAGNESIA) 400 MG/5ML suspension 30 mL  30 mL Oral Daily PRN Jojo Acevedo MD        sennosides-docusate sodium (SENOKOT-S) 8.6-50 MG tablet 1 tablet  1 tablet Oral Daily Jojo Acevedo MD   1 tablet at 10/25/17 0841    ondansetron (ZOFRAN) injection 4 mg  4 mg Intravenous Q6H PRN Jojo Acevedo MD        traMADol Elenora Hammer) tablet 50 mg  50 mg Oral Q4H PRN Jojo Acevedo MD        sodium chloride flush 0.9 % injection 3 mL  3 mL Intravenous Q8H MerJADON Johnson-C   Stopped at 10/25/17 9406    cyclobenzaprine (FLEXERIL) tablet 5 mg  5 mg Oral BID PRN Brenna Morgan PA-C   5 mg at 10/24/17 0791    docusate sodium (COLACE) capsule 100 mg  100 mg Oral Daily Brenna Morgan PA-C   100 mg at 10/25/17 0840    lactobacillus acidophilus [facility]) 1 tablet  1 tablet Oral TID Brenna Morgan PA-C   1 tablet at 10/25/17 0841    melatonin tablet 5 mg  5 mg Oral Nightly Petra Powell PA-C   5 mg at 10/24/17 2138    polyethylene glycol (GLYCOLAX) packet 17 g  17 g Oral Daily Janice Ing, PA-C   17 g at 10/25/17 0840    spironolactone (ALDACTONE) tablet 25 mg  25 mg Oral Daily Janice Ing, PA-C   25 mg at 10/25/17 4683    doxycycline hyclate (VIBRAMYCIN) capsule 100 mg  100 mg Oral Daily Janice Ing, PA-C   100 mg at 10/25/17 0840     Allergies   Allergen Reactions    Bactrim [Sulfamethoxazole-Trimethoprim] Rash     Rash of arms and legs     Principal Problem:    Closed fracture of right hip (HCC)  Active Problems:    HTN (hypertension)    Pacemaker    Thrombocytopenia (HCC)    Blood pressure (!) 115/46, pulse 83, temperature 98.1 °F (36.7 °C), temperature source Axillary, resp. rate 18, weight 148 lb (67.1 kg), SpO2 100 %, not currently breastfeeding. Subjective:  Symptoms:  She reports weakness. No shortness of breath, malaise, cough, chest pain, chest pressure or anxiety. Diet:  No nausea. Pain:  She reports no pain. Objective:  General Appearance:  Comfortable, well-appearing and in no acute distress. Vital signs: (most recent): Blood pressure (!) 119/55, pulse 77, temperature 97.9 °F (36.6 °C), temperature source Oral, resp. rate 16, weight 148 lb (67.1 kg), SpO2 97 %, not currently breastfeeding. Lungs:  Normal effort and normal respiratory rate. Breath sounds clear to auscultation. Heart: Regular rhythm. S1 normal and S2 normal.  Positive for murmur. Abdomen: Abdomen is soft. Bowel sounds are normal.     Pulses: Distal pulses are intact. Neurological: Patient is alert and oriented to person, place and time. Pupils:  Pupils are equal, round, and reactive to light. Skin:  Warm and dry.       Lab Results   Component Value Date    WBC 7.6 10/25/2017    HGB 8.7 (L) 10/25/2017    HCT 26.2 (L) 10/25/2017    MCV 99.9 10/25/2017    PLT 71 (L) 10/25/2017     Lab Results   Component Value Date     10/25/2017    K 4.4 10/25/2017     10/25/2017    CO2 26 10/25/2017    BUN 25 10/25/2017    CREATININE 0.50 10/25/2017    GLUCOSE 122 10/25/2017    GLUCOSE 193 05/05/2012    CALCIUM 8.1 10/25/2017      Past Medical History:   Diagnosis Date    Back pain     Constipation     Diverticulosis     DVT (deep venous thrombosis) (HCC)     H/O echocardiogram     EF normal    Hearing loss     Heart block AV third degree (HCC)     HTN (hypertension)     Osteoporosis     Pacemaker     Peripheral neuropathy ([facility]     Pulmonary hypertension     Pulmonary nodule, left     Thrombocytopenia (HCC)     Tricuspid regurgitation     Tubular adenoma 2010         Assessment & Plan  1. S/p fall unk mechanism vs syncope  No sensation of passing out  2. R hip fracture  S/p right TFN  Pain is controlled  3. Chronic R knee prosthetic infection  4.  HTN  stable  5)  Displaced otontoid fx  FU neurosurgery eval  6) chronic thrombocytopenia monitor  7) acute anemia 2 to surgery     Josette Casanova MD  10/25/2017

## 2017-10-25 NOTE — PROGRESS NOTES
Physical Therapy Med Surg Daily Treatment Note  [facility]  Room: N223/N223-       NAME: Mechelle Richmond  : 1924 (80 y.o.)  MRN: [id number]  CODE STATUS: Full Code    Date of Service: 10/25/2017    Patient Diagnosis(es): Hip fracture requiring operative repair, right, closed, initial encounter [facility]) Ky Cornell   Chief Complaint   Patient presents with    Fall    Hip Pain     right    Knee Pain     right     Patient Active Problem List    Diagnosis Date Noted    Closed fracture of right hip ([address] 10/22/2017    Infection of prosthetic right knee joint ([address]     Right knee skin infection 2016    Thrombocytopenia ([address] 2015    Back pain     HTN (hypertension)     Osteoporosis     Peripheral neuropathy ([address]     Tubular adenoma     Pacemaker         Past Medical History:   Diagnosis Date    Back pain     Constipation     Diverticulosis     DVT (deep venous thrombosis) ([address]     H/O echocardiogram     EF normal    Hearing loss     Heart block AV third degree ([address]     HTN (hypertension)     Osteoporosis     Pacemaker     Peripheral neuropathy ([address]     Pulmonary hypertension     Pulmonary nodule, left     Thrombocytopenia (HCC)     Tricuspid regurgitation     Tubular adenoma 2010     Past Surgical History:   Procedure Laterality Date    COLONOSCOPY      declines further-hx adenoma    HYSTERECTOMY  1965    NO CANCER    SHOULDER ARTHROPLASTY      right     TOTAL KNEE ARTHROPLASTY  2008    right         Restrictions  Restrictions/Precautions: Weight Bearing, Fall Risk  Lower Extremity Weight Bearing Restrictions  Right Lower Extremity Weight Bearing: Weight Bearing As Tolerated  Required Braces or Orthoses  Cervical: c-collar (PRN for comfort and pt declined using it today)  Position Activity Restriction  Other position/activity restrictions: no hip precautions    Subjective   General  Chart Reviewed: Yes  Family / Caregiver Present: No  Pre Treatment Pain Screening  Pain at present: 0  Scale Used: Numeric Score  Intervention List: Patient able to continue with treatment    Pain Screening  Patient Currently in Pain: Denies     Pain Reassessment:   Pain Assessment  Pain Assessment: 0-10  Pain Level: 0 (\"no pain just a little uncomfortable\")       Orientation  Orientation  Overall Orientation Status: Within Functional Limits    Objective   Bed mobility  Rolling to Left: Maximum assistance  Rolling to Right: Maximum assistance  Supine to Sit: Maximum assistance  Sit to Supine: Dependent/Total;Maximum assistance  Comment: +2 with cues and minimal follow thru. Bed flat and use of bedrails    Transfers  Sit to Stand: Maximum Assistance  Stand to sit: Maximum Assistance  Comment: +2 for safety at [employer] with max cues and fair follow thru at [employer].   (R) knee IR needing physical assist to decrease amt of IR  Multiple reps      Ambulation  Ambulation?: Yes       Balance  Standing - Static: Poor  Standing - Dynamic: Poor  Comments: at [employer] with flexed trunk and knees (R) knee IR          Increased time and effort with all activities  BTB with abd pillow btw knees              Assessment   Activity Tolerance  Activity Tolerance: Patient Tolerated treatment well;Patient limited by endurance     Discharge Recommendations:  Continue to assess pending progress, Patient would benefit from continued therapy after discharge    Goals  Short term goals  Short term goal 1: Min A with bed mobility  Short term goal 2: R hip PROM to neutral  Short term goal 3: sit <>stand with arleth steady x 5 reps with Min A  Short term goal 4: seated and supine HEP to improve R LE ROM and strength  Short term goal 5: seated functional reaching activities OOBOS with no LOB   Long term goals  Long term goal 1: Sit<>stand with 2ww with Mod A  Long term goal 2: functional transfers with Mod A   Long term goal 3: standing with 2ww >1 minutes with Min A  Long term goal 4: to be assessed for gait when

## 2017-10-26 ENCOUNTER — OFFICE VISIT (OUTPATIENT)
Dept: GERIATRIC MEDICINE | Age: 82
End: 2017-10-26

## 2017-10-26 VITALS
RESPIRATION RATE: 18 BRPM | TEMPERATURE: 98 F | DIASTOLIC BLOOD PRESSURE: 45 MMHG | WEIGHT: 153 LBS | HEIGHT: 66 IN | OXYGEN SATURATION: 100 % | SYSTOLIC BLOOD PRESSURE: 124 MMHG | HEART RATE: 78 BPM | BODY MASS INDEX: 24.59 KG/M2

## 2017-10-26 DIAGNOSIS — I10 ESSENTIAL HYPERTENSION: ICD-10-CM

## 2017-10-26 DIAGNOSIS — Z87.81 S/P RIGHT HIP FRACTURE: ICD-10-CM

## 2017-10-26 DIAGNOSIS — T84.54XS INFECTION ASSOCIATED WITH INTERNAL LEFT KNEE PROSTHESIS, SEQUELA: ICD-10-CM

## 2017-10-26 DIAGNOSIS — M25.551 PAIN OF RIGHT HIP JOINT: Primary | ICD-10-CM

## 2017-10-26 DIAGNOSIS — R26.9 GAIT ABNORMALITY: ICD-10-CM

## 2017-10-26 LAB
ANION GAP SERPL CALCULATED.3IONS-SCNC: 12 MEQ/L (ref 7–13)
BASOPHILS ABSOLUTE: 0 K/UL (ref 0–0.2)
BASOPHILS RELATIVE PERCENT: 0.4 %
BUN BLDV-MCNC: 18 MG/DL (ref 8–23)
CALCIUM SERPL-MCNC: 7.9 MG/DL (ref 8.6–10.2)
CHLORIDE BLD-SCNC: 106 MEQ/L (ref 98–107)
CO2: 23 MEQ/L (ref 22–29)
CREAT SERPL-MCNC: 0.39 MG/DL (ref 0.5–0.9)
EKG ATRIAL RATE: 79 BPM
EKG ATRIAL RATE: 83 BPM
EKG ATRIAL RATE: 86 BPM
EKG ATRIAL RATE: 88 BPM
EKG ATRIAL RATE: 88 BPM
EKG P AXIS: 31 DEGREES
EKG P AXIS: 60 DEGREES
EKG P AXIS: 68 DEGREES
EKG P AXIS: 70 DEGREES
EKG P AXIS: 84 DEGREES
EKG P-R INTERVAL: 166 MS
EKG P-R INTERVAL: 172 MS
EKG P-R INTERVAL: 172 MS
EKG P-R INTERVAL: 174 MS
EKG P-R INTERVAL: 194 MS
EKG Q-T INTERVAL: 430 MS
EKG Q-T INTERVAL: 438 MS
EKG Q-T INTERVAL: 440 MS
EKG Q-T INTERVAL: 466 MS
EKG Q-T INTERVAL: 470 MS
EKG QRS DURATION: 162 MS
EKG QRS DURATION: 166 MS
EKG QRS DURATION: 166 MS
EKG QRS DURATION: 168 MS
EKG QRS DURATION: 168 MS
EKG QTC CALCULATION (BAZETT): 520 MS
EKG QTC CALCULATION (BAZETT): 524 MS
EKG QTC CALCULATION (BAZETT): 532 MS
EKG QTC CALCULATION (BAZETT): 538 MS
EKG QTC CALCULATION (BAZETT): 547 MS
EKG R AXIS: -65 DEGREES
EKG R AXIS: -67 DEGREES
EKG R AXIS: -70 DEGREES
EKG R AXIS: -71 DEGREES
EKG R AXIS: -72 DEGREES
EKG T AXIS: 104 DEGREES
EKG T AXIS: 107 DEGREES
EKG T AXIS: 109 DEGREES
EKG T AXIS: 113 DEGREES
EKG T AXIS: 114 DEGREES
EKG VENTRICULAR RATE: 79 BPM
EKG VENTRICULAR RATE: 83 BPM
EKG VENTRICULAR RATE: 86 BPM
EKG VENTRICULAR RATE: 88 BPM
EKG VENTRICULAR RATE: 88 BPM
EOSINOPHILS ABSOLUTE: 0.1 K/UL (ref 0–0.7)
EOSINOPHILS RELATIVE PERCENT: 1.4 %
GFR AFRICAN AMERICAN: >60
GFR NON-AFRICAN AMERICAN: >60
GLUCOSE BLD-MCNC: 95 MG/DL (ref 74–109)
HCT VFR BLD CALC: 23.7 % (ref 37–47)
HEMOGLOBIN: 7.8 G/DL (ref 12–16)
LYMPHOCYTES ABSOLUTE: 0.8 K/UL (ref 1–4.8)
LYMPHOCYTES RELATIVE PERCENT: 15.3 %
MCH RBC QN AUTO: 32.9 PG (ref 27–31.3)
MCHC RBC AUTO-ENTMCNC: 32.9 % (ref 33–37)
MCV RBC AUTO: 100 FL (ref 82–100)
MONOCYTES ABSOLUTE: 0.5 K/UL (ref 0.2–0.8)
MONOCYTES RELATIVE PERCENT: 9.5 %
NEUTROPHILS ABSOLUTE: 3.6 K/UL (ref 1.4–6.5)
NEUTROPHILS RELATIVE PERCENT: 73.4 %
PDW BLD-RTO: 13.3 % (ref 11.5–14.5)
PLATELET # BLD: 76 K/UL (ref 130–400)
POTASSIUM SERPL-SCNC: 4.1 MEQ/L (ref 3.5–5.1)
RBC # BLD: 2.37 M/UL (ref 4.2–5.4)
SODIUM BLD-SCNC: 141 MEQ/L (ref 132–144)
WBC # BLD: 5.7 K/UL (ref 4.8–10.8)

## 2017-10-26 PROCEDURE — 99304 1ST NF CARE SF/LOW MDM 25: CPT | Performed by: FAMILY MEDICINE

## 2017-10-26 PROCEDURE — 80048 BASIC METABOLIC PNL TOTAL CA: CPT

## 2017-10-26 PROCEDURE — 6370000000 HC RX 637 (ALT 250 FOR IP): Performed by: PHYSICIAN ASSISTANT

## 2017-10-26 PROCEDURE — 6370000000 HC RX 637 (ALT 250 FOR IP): Performed by: ORTHOPAEDIC SURGERY

## 2017-10-26 PROCEDURE — 97112 NEUROMUSCULAR REEDUCATION: CPT

## 2017-10-26 PROCEDURE — 97535 SELF CARE MNGMENT TRAINING: CPT

## 2017-10-26 PROCEDURE — 6360000002 HC RX W HCPCS: Performed by: NURSE PRACTITIONER

## 2017-10-26 PROCEDURE — 93005 ELECTROCARDIOGRAM TRACING: CPT

## 2017-10-26 PROCEDURE — 2700000000 HC OXYGEN THERAPY PER DAY

## 2017-10-26 PROCEDURE — 36415 COLL VENOUS BLD VENIPUNCTURE: CPT

## 2017-10-26 PROCEDURE — 2580000003 HC RX 258: Performed by: ORTHOPAEDIC SURGERY

## 2017-10-26 PROCEDURE — 85025 COMPLETE CBC W/AUTO DIFF WBC: CPT

## 2017-10-26 RX ADMIN — ACETAMINOPHEN 650 MG: 325 TABLET ORAL at 06:26

## 2017-10-26 RX ADMIN — DOCUSATE SODIUM 100 MG: 100 CAPSULE, LIQUID FILLED ORAL at 09:01

## 2017-10-26 RX ADMIN — POLYETHYLENE GLYCOL 3350 17 G: 17 POWDER, FOR SOLUTION ORAL at 09:01

## 2017-10-26 RX ADMIN — ENOXAPARIN SODIUM 30 MG: 100 INJECTION SUBCUTANEOUS at 09:01

## 2017-10-26 RX ADMIN — SPIRONOLACTONE 25 MG: 25 TABLET, FILM COATED ORAL at 09:01

## 2017-10-26 RX ADMIN — SENNOSIDES AND DOCUSATE SODIUM 1 TABLET: 8.6; 5 TABLET ORAL at 09:01

## 2017-10-26 RX ADMIN — DOXYCYCLINE HYCLATE 100 MG: 100 CAPSULE, GELATIN COATED ORAL at 09:01

## 2017-10-26 RX ADMIN — LACTOBACILLUS TAB 1 TABLET: TAB at 09:01

## 2017-10-26 RX ADMIN — SODIUM CHLORIDE, PRESERVATIVE FREE 10 ML: 5 INJECTION INTRAVENOUS at 09:02

## 2017-10-26 ASSESSMENT — PAIN SCALES - GENERAL
PAINLEVEL_OUTOF10: 0
PAINLEVEL_OUTOF10: 1
PAINLEVEL_OUTOF10: 0

## 2017-10-26 NOTE — PROGRESS NOTES
Hip surgery    Progress Note    Subjective:     Post-Operative Day: 3 Status Post right TFN  Systemic or Specific Complaints:No Complaints    Objective:     CURRENT VITALS:  BP (!) 125/47   Pulse 82   Temp 97.8 °F (36.6 °C) (Oral)   Resp 18   Ht 5' 6\" (1.676 m)   Wt 153 lb (69.4 kg)   SpO2 100%   BMI 24.69 kg/m²     General: alert, appears stated age and cooperative   Wound: Wound clean and dry no evidence of infection. Motion: Painless Range of Motion   DVT Exam: No evidence of DVT seen on physical exam.       NVI in lower extremity. Thigh swollen but soft. Moving foot and ankle. Data Review  Recent Labs      10/24/17   0519  10/25/17   0532  10/26/17   0520   WBC  7.7  7.6  5.7   RBC  3.26*  2.62*  2.37*   HGB  10.6*  8.7*  7.8*   HCT  32.0*  26.2*  23.7*   MCV  98.3  99.9  100.0   MCH  32.6*  33.0*  32.9*   MCHC  33.2  33.1  32.9*   RDW  13.1  13.3  13.3   PLT  79*  71*  76*       Assessment:     Status Post right TFN. Doing well postoperatively. Pt had fluids going at 125ml/ hr- partly dilutional on acute blood loss anemia. Will plan to d/c fluids and recheck cbc at noon. If continues to be stable ok for d/c.  Pt has chronic right knee problems and uses a brace which her family brought yesterday and patient was able to use with therapy    Plan:      1: Discharge today, Return to Clinic:    2:  Continue Deep venous thrombosis prophylaxis   3:  Continue physical therapy  4:  Continue Pain Control

## 2017-10-26 NOTE — PROGRESS NOTES
Physical Therapy Med Surg Daily Treatment Note  [facility]Department[facility]  Room: N223/N223-       NAME: Melvin Christie  : 1924 (80 y.o.)  MRN: [id number]  CODE STATUS: Full Code    Date of Service: 10/26/2017    Patient Diagnosis(es): Hip fracture requiring operative repair, right, closed, initial encounter [facility]) Shelbi Maciason   Chief Complaint   Patient presents with    Fall    Hip Pain     right    Knee Pain     right     Patient Active Problem List    Diagnosis Date Noted    Closed fracture of right hip ([address] 10/22/2017    Infection of prosthetic right knee joint ([address]     Right knee skin infection 2016    Thrombocytopenia ([address] 2015    Back pain     HTN (hypertension)     Osteoporosis     Peripheral neuropathy ([address]     Tubular adenoma     Pacemaker         Past Medical History:   Diagnosis Date    Back pain     Constipation     Diverticulosis     DVT (deep venous thrombosis) ([address]     H/O echocardiogram     EF normal    Hearing loss     Heart block AV third degree ([address]     HTN (hypertension)     Osteoporosis     Pacemaker     Peripheral neuropathy ([address]     Pulmonary hypertension     Pulmonary nodule, left     Thrombocytopenia (HCC)     Tricuspid regurgitation     Tubular adenoma 2010     Past Surgical History:   Procedure Laterality Date    COLONOSCOPY      declines further-hx adenoma    HYSTERECTOMY  1965    NO CANCER    SHOULDER ARTHROPLASTY      right     TOTAL KNEE ARTHROPLASTY  2008    right         Restrictions  Restrictions/Precautions: Weight Bearing  Lower Extremity Weight Bearing Restrictions  Right Lower Extremity Weight Bearing: Weight Bearing As Tolerated  Required Braces or Orthoses  Cervical: c-collar (PRN for comfort and pt declined using it today)  Position Activity Restriction  Other position/activity restrictions: no hip precautions    Subjective   General  Chart Reviewed: Yes  Family / Caregiver Present: No  Pre Treatment Pain Screening  Pain at present: 0  Scale Used: Numeric Score  Intervention List: Patient able to continue with treatment    Pain Screening  Patient Currently in Pain: Denies     Pain Reassessment:   Pain Assessment  Pain Assessment: 0-10  Pain Level: 0       Orientation  Orientation  Overall Orientation Status: Within Normal Limits    Objective   Bed mobility  Supine to Sit: Maximum assistance  Sit to Supine: Maximum assistance (+2)  Comment: Tactile and VCs with minimal follow thru. Bed flat and use of bedrail.  (abd pillow between knees when BTB    Transfers  Sit to Stand: Moderate Assistance  Stand to sit: Moderate Assistance  Comment: +2 for safety at [employer] with cues for safety/sequencing with fair follow thru.  (shoes donned)    Ambulation  Ambulation?: No       Balance  Comments: stood x 2 (1 min each time) with modA x2.   Knees flex and (R) IR with assist and cues to minimize  Exercises  Quad Sets: x10  Hip Abduction: x10 each in sitting and supine         Cryotherapy (Minutes\Location): ice PRN for pain           Assessment   Activity Tolerance  Activity Tolerance: Patient Tolerated treatment well;Patient limited by endurance     Discharge Recommendations:  Continue to assess pending progress, Patient would benefit from continued therapy after discharge    Goals  Short term goals  Short term goal 1: Min A with bed mobility  Short term goal 2: R hip PROM to neutral  Short term goal 3: sit <>stand with arleth steady x 5 reps with Min A  Short term goal 4: seated and supine HEP to improve R LE ROM and strength  Short term goal 5: seated functional reaching activities OOBOS with no LOB   Long term goals  Long term goal 1: Sit<>stand with 2ww with Mod A  Long term goal 2: functional transfers with Mod A   Long term goal 3: standing with 2ww >1 minutes with Min A  Long term goal 4: to be assessed for gait when appropriate  Patient Goals   Patient goals : \"Walk again\"    Plan    Plan  Times per week: 7  Times per day: Twice

## 2017-10-27 LAB
BLOOD BANK DISPENSE STATUS: NORMAL
BLOOD BANK DISPENSE STATUS: NORMAL
BLOOD BANK PRODUCT CODE: NORMAL
BLOOD BANK PRODUCT CODE: NORMAL
BPU ID: NORMAL
BPU ID: NORMAL
DESCRIPTION BLOOD BANK: NORMAL
DESCRIPTION BLOOD BANK: NORMAL

## 2017-10-27 RX ORDER — CYCLOBENZAPRINE HCL 5 MG
5 TABLET ORAL 2 TIMES DAILY PRN
COMMUNITY
End: 2017-12-28 | Stop reason: SDUPTHER

## 2017-10-27 RX ORDER — TRAMADOL HYDROCHLORIDE 50 MG/1
50 TABLET ORAL EVERY 6 HOURS PRN
COMMUNITY
End: 2017-12-28 | Stop reason: SDUPTHER

## 2017-10-27 RX ORDER — ACETAMINOPHEN 325 MG/1
650 TABLET ORAL EVERY 4 HOURS PRN
COMMUNITY
End: 2017-12-28 | Stop reason: SDUPTHER

## 2017-10-27 RX ORDER — SPIRONOLACTONE 25 MG/1
25 TABLET ORAL DAILY
COMMUNITY
End: 2017-12-28 | Stop reason: SDUPTHER

## 2017-10-27 RX ORDER — DOXYCYCLINE 100 MG/1
100 TABLET ORAL DAILY
COMMUNITY
End: 2017-12-28 | Stop reason: SDUPTHER

## 2017-10-27 RX ORDER — CHOLECALCIFEROL (VITAMIN D3) 125 MCG
5 CAPSULE ORAL NIGHTLY
COMMUNITY
End: 2017-12-28 | Stop reason: SDUPTHER

## 2017-10-27 RX ORDER — UREA 10 %
LOTION (ML) TOPICAL 3 TIMES DAILY
COMMUNITY
End: 2017-12-28 | Stop reason: SDUPTHER

## 2017-10-27 NOTE — PROGRESS NOTES
Physical Therapy  Facility/Department: [facility] Q620/P413-93  Physical Therapy Discharge      NAME: Geena Barragan    : 1924 (80 y.o.)  MRN: [id number]    Account: [de-identified]  Gender: female      Patient has been discharged from acute care hospital. DC patient from current PT program.      Electronically signed by Paramjit Fox PT on 10/27/17 at 12:57 PM

## 2017-10-30 ENCOUNTER — CARE COORDINATION (OUTPATIENT)
Dept: CASE MANAGEMENT | Age: 82
End: 2017-10-30

## 2017-10-30 VITALS — RESPIRATION RATE: 18 BRPM | HEART RATE: 79 BPM

## 2017-10-30 NOTE — DISCHARGE SUMMARY
Discharge summary  This patient Quincy Santana was admitted to the hospital on 10/22/2017  after sustaining a fall and hip fracture. pt then underwent clearances and had  Procedure(s) (LRB):  TFN NAIL C-ARM RIGHT (Right) without complications. During the postoperative period,while in hospital, patient was medically managed by the hospitalist. Please see medial notes and H&P for patients additional diagnoses. Ortho agrees with all medical diagnoses and treatments while patient in hospital.    Hospital course  Patient tolerated surgical procedure well and there was no complications. Patient progressed adequtly through their recovery during hospital stay including PT and rehabilitation. DVT prophylaxis was implemented POD#1  Patient was then D/C on 10/26/2017 to Skilled nursing facility  in stable condition. Patient was instructed on the use of pain medications, the signs and symptoms of infection, and was given our number to call should they have any questions or concerns following discharge.

## 2017-10-30 NOTE — PROGRESS NOTES
Farheen Edouard  1942 date of service 10/26/2017. Dr. Kyle Galvan M.D. History and physical                                               Chief Complaint: Right hip fracture status post open reduction internal fixation. Right lower extremity pain  History of Present Illness:      59-year-old female with multiple medical conditions as listed below and past medical history significant for osteoarthritis, DVT, chronic right knee prosthetic infection, gait abnormal with history of fall. She presented to [facility] on 10/22/2017 with right hip pain status post mechanical fall, she fell while going to the bathroom in the morning. She was diagnosed with right peritrochanteric hip fracture. She received Right hip cephalomedullary nail by Dr. Rodrick Lion on 10/23/2017. She has a chronic history of knee prosthetic infection, status post evaluation by TAMMIE read , currently on chronic PO doxycycline. She was evaluated by neurosurgery due to previous history of chronic back pain with history of L2-L3 vertebral compression fractures and type 2 ununited odontoid fracture with minimal retrodisplacement without any significant bone encroachment. Neurosurgery recommended follow-up as an outpatient. After medically stable she was transferred to [address] [place] [address] nursing [facility] for rehabilitation.          Review of Systems:   ( ) Not able to answer or subjective due to: dementia (   ) aphasia ( ) altered mental Status ( )  ( ) Review of systems obtained with assistant from medical staff and/or family member     General: Weight Loss ( denied )    Weight Gain   (-  )    Fever ( -   )    Chills (  -    )    Appetite ( at baseline )     Negative (    )   HEENT: Difficulty Hearing (  -)   Tinnitus ( -  )   Earache (-  )   Poor Vision ( -   ) Dysphasia (denied)   Negative (    )    Heart: Chest Pain (-)   Palpitations ( - )

## 2017-10-30 NOTE — CARE COORDINATION
Received a e-mail patient discharged to [facility] on 10/26/17. Post acute care coordinator will continue to follow.  TOYA Eldridge RN  Care Transition Coordinator  [phone]

## 2017-11-09 ENCOUNTER — OFFICE VISIT (OUTPATIENT)
Dept: GERIATRIC MEDICINE | Age: 82
End: 2017-11-09

## 2017-11-09 DIAGNOSIS — M25.551 RIGHT HIP PAIN: Primary | ICD-10-CM

## 2017-11-09 DIAGNOSIS — D64.9 ANEMIA, UNSPECIFIED TYPE: ICD-10-CM

## 2017-11-09 DIAGNOSIS — R53.1 GENERALIZED WEAKNESS: ICD-10-CM

## 2017-11-09 DIAGNOSIS — Z96.659 CHRONIC INFECTION OF PROSTHETIC KNEE, SUBSEQUENT ENCOUNTER: ICD-10-CM

## 2017-11-09 DIAGNOSIS — T84.59XD CHRONIC INFECTION OF PROSTHETIC KNEE, SUBSEQUENT ENCOUNTER: ICD-10-CM

## 2017-11-09 PROCEDURE — 99309 SBSQ NF CARE MODERATE MDM 30: CPT | Performed by: NURSE PRACTITIONER

## 2017-11-10 VITALS
OXYGEN SATURATION: 97 % | RESPIRATION RATE: 18 BRPM | DIASTOLIC BLOOD PRESSURE: 66 MMHG | HEIGHT: 65 IN | BODY MASS INDEX: 25.33 KG/M2 | TEMPERATURE: 98 F | WEIGHT: 152 LBS | SYSTOLIC BLOOD PRESSURE: 134 MMHG | HEART RATE: 77 BPM

## 2017-11-10 LAB
ANION GAP SERPL CALCULATED.3IONS-SCNC: 11 MEQ/L (ref 7–13)
BUN BLDV-MCNC: 22 MG/DL (ref 8–23)
CALCIUM SERPL-MCNC: 9 MG/DL (ref 8.6–10.2)
CHLORIDE BLD-SCNC: 104 MEQ/L (ref 98–107)
CO2: 26 MEQ/L (ref 22–29)
CREAT SERPL-MCNC: 0.6 MG/DL (ref 0.5–0.9)
GFR AFRICAN AMERICAN: >60
GFR NON-AFRICAN AMERICAN: >60
GLUCOSE BLD-MCNC: 81 MG/DL (ref 74–109)
HCT VFR BLD CALC: 32.1 % (ref 37–47)
HEMOGLOBIN: 10.5 G/DL (ref 12–16)
MCH RBC QN AUTO: 33.3 PG (ref 27–31.3)
MCHC RBC AUTO-ENTMCNC: 32.5 % (ref 33–37)
MCV RBC AUTO: 102.4 FL (ref 82–100)
PDW BLD-RTO: 15.3 % (ref 11.5–14.5)
PLATELET # BLD: 200 K/UL (ref 130–400)
POTASSIUM SERPL-SCNC: 5.1 MEQ/L (ref 3.5–5.1)
RBC # BLD: 3.14 M/UL (ref 4.2–5.4)
SODIUM BLD-SCNC: 141 MEQ/L (ref 132–144)
WBC # BLD: 3.4 K/UL (ref 4.8–10.8)

## 2017-11-10 ASSESSMENT — ENCOUNTER SYMPTOMS
WHEEZING: 0
SHORTNESS OF BREATH: 0
ABDOMINAL PAIN: 0
COUGH: 0
CONSTIPATION: 0

## 2017-11-10 NOTE — PROGRESS NOTES
Thrombocytopenia ([facility]     Tricuspid regurgitation     Tubular adenoma 2010     Past Surgical History:   Procedure Laterality Date    COLONOSCOPY  1990s    declines further-hx adenoma    HIP FRACTURE SURGERY Right 10/23/2017    TFN NAIL C-ARM RIGHT performed by Arnaldo Pedro MD at [address] [place] [address]    right     TOTAL KNEE ARTHROPLASTY  2008    right     Medications reviewed at facility. Objective  Vitals:    11/09/17 0920   BP: 134/66   Pulse: 77   Resp: 18   Temp: 98 °F (36.7 °C)   SpO2: 97%   Weight: 152 lb (68.9 kg)   Height: 5' 5\" (1.651 m)     Physical Exam   Constitutional: She is well-developed, well-nourished, and in no distress. Cardiovascular: Normal rate, regular rhythm and normal heart sounds. No murmur heard. No lower extremity edema   Pulmonary/Chest: Effort normal and breath sounds normal. She has no wheezes. Abdominal: Soft. Bowel sounds are normal. There is no tenderness. Musculoskeletal:        Right hip: She exhibits decreased range of motion, tenderness and swelling. Left hip: Normal.        Right knee: She exhibits swelling and deformity. She exhibits no erythema. No tenderness found. Skin:        Psychiatric: Mood, memory and affect normal.     Assessment & Plan   1. Right hip pain      Improved, using Tylenol PRN. 2. Generalized weakness      Monitoring; continue PT/OT   3. Anemia, unspecified type      Monitoring; check CBC in the AM.    4. Chronic infection of prosthetic knee, subsequent encounter      Continue Doxycycline 100 mg daily per ID, no stop date. Medications reviewed and updated  Encourage fluid intake, exercise as tolerated, and good nutrition. Continue therapies, and continue to stress fall prevention strategies. Return for follow up as needed while in skilled rehab.     Mattie Priest, ANCELMO

## 2017-11-14 ENCOUNTER — CARE COORDINATION (OUTPATIENT)
Dept: MEDSURG UNIT | Age: 82
End: 2017-11-14

## 2017-11-14 ENCOUNTER — OFFICE VISIT (OUTPATIENT)
Dept: GERIATRIC MEDICINE | Age: 82
End: 2017-11-14

## 2017-11-14 DIAGNOSIS — D64.9 ANEMIA, UNSPECIFIED TYPE: ICD-10-CM

## 2017-11-14 DIAGNOSIS — S61.212D LACERATION OF RIGHT MIDDLE FINGER WITHOUT FOREIGN BODY WITHOUT DAMAGE TO NAIL, SUBSEQUENT ENCOUNTER: ICD-10-CM

## 2017-11-14 DIAGNOSIS — R53.1 GENERALIZED WEAKNESS: Primary | ICD-10-CM

## 2017-11-14 PROCEDURE — 99308 SBSQ NF CARE LOW MDM 20: CPT | Performed by: NURSE PRACTITIONER

## 2017-11-14 ASSESSMENT — ENCOUNTER SYMPTOMS
WHEEZING: 0
ABDOMINAL PAIN: 0
COUGH: 0
CONSTIPATION: 0
SHORTNESS OF BREATH: 0

## 2017-11-14 NOTE — CARE COORDINATION
Spoke with RN for updates No acute issues. Laceration on rt middle finger from previous fall is healing well. Transfer to [address] [place] [address]. Stated  Pt has hit \"plateau\" . PT is in process of attempting to get DME for built up shoe due to knee stuck in 30 degree flexion. Pt unable to stand on rt heel fully. May not be covered by insurance. Max assist bed mobility, Mod assist with transfers, ambulates inside parallel bars @ 8ft with poor mechanics. Transfer [address]. Pt is mod assist with bathing-prefers sponge baths because she can be more independent with them. Min-Mod assist with toileting-grabs bars to pivet onto toilet. Max assist lower body dressing, independent with upper body dressing grooming independent in sitting position. Pt has family visiting periodically. No discharge plans at this time. Will continue to follow for transitions.     Paola Box RN BSN   Care Transitions Coordinator  [phone]

## 2017-11-15 VITALS
BODY MASS INDEX: 24.93 KG/M2 | HEART RATE: 66 BPM | SYSTOLIC BLOOD PRESSURE: 121 MMHG | TEMPERATURE: 97.7 F | WEIGHT: 149.8 LBS | RESPIRATION RATE: 18 BRPM | OXYGEN SATURATION: 95 % | DIASTOLIC BLOOD PRESSURE: 57 MMHG

## 2017-11-16 ENCOUNTER — OFFICE VISIT (OUTPATIENT)
Dept: INFECTIOUS DISEASES | Age: 82
End: 2017-11-16

## 2017-11-16 VITALS
HEIGHT: 67 IN | TEMPERATURE: 97.8 F | HEART RATE: 69 BPM | DIASTOLIC BLOOD PRESSURE: 69 MMHG | SYSTOLIC BLOOD PRESSURE: 107 MMHG

## 2017-11-16 DIAGNOSIS — T84.59XD INFECTED PROSTHETIC KNEE JOINT, SUBSEQUENT ENCOUNTER: Primary | ICD-10-CM

## 2017-11-16 DIAGNOSIS — Z96.659 INFECTED PROSTHETIC KNEE JOINT, SUBSEQUENT ENCOUNTER: Primary | ICD-10-CM

## 2017-11-16 PROCEDURE — 99212 OFFICE O/P EST SF 10 MIN: CPT | Performed by: INTERNAL MEDICINE

## 2017-11-16 ASSESSMENT — ENCOUNTER SYMPTOMS: RESPIRATORY NEGATIVE: 1

## 2017-11-16 NOTE — PROGRESS NOTES
Subjective:      Patient ID: Ashely Feliz is a 80 y.o. female. HPI    Pt is a 80-yr old female who is here for a follow up appointment has right prosthetic knee infection, on PO antibiotics for suppression.       Currently is on PO Doxy       1/13/2017  4:18 PM - Ric, Chpo Incoming Lab Results From Soft   Component Results   Component Value Ref Range & Units Status   Sed Rate 51 (H) 0 - 30 mm          All  Bactrim      Review of Systems   Constitutional: Negative. Respiratory: Negative. Cardiovascular: Negative. Genitourinary: Negative. Neurological: Negative. Objective:   Physical Exam   Constitutional: She appears well-developed. HENT:   Head: Normocephalic. Neck: Normal range of motion. No JVD present. No tracheal deviation present. No thyromegaly present. Cardiovascular: Normal rate. Exam reveals no gallop. No murmur heard. Pulmonary/Chest: No respiratory distress. She has no wheezes. She has no rales. She exhibits no tenderness. Abdominal: Soft. She exhibits no distension. There is no tenderness. There is no rebound and no guarding. Musculoskeletal:        Legs:  Lymphadenopathy:     She has no cervical adenopathy. Assessment:       1.  Infected prosthetic knee joint, subsequent encounter             Plan:      Osteomyelitis l knee    Po doxy for life

## 2017-11-21 LAB
ANION GAP SERPL CALCULATED.3IONS-SCNC: 11 MEQ/L (ref 7–13)
BUN BLDV-MCNC: 20 MG/DL (ref 8–23)
CALCIUM SERPL-MCNC: 9.3 MG/DL (ref 8.6–10.2)
CHLORIDE BLD-SCNC: 102 MEQ/L (ref 98–107)
CO2: 26 MEQ/L (ref 22–29)
CREAT SERPL-MCNC: 0.57 MG/DL (ref 0.5–0.9)
GFR AFRICAN AMERICAN: >60
GFR NON-AFRICAN AMERICAN: >60
GLUCOSE BLD-MCNC: 75 MG/DL (ref 74–109)
HCT VFR BLD CALC: 35.1 % (ref 37–47)
HEMOGLOBIN: 11.5 G/DL (ref 12–16)
MCH RBC QN AUTO: 33.3 PG (ref 27–31.3)
MCHC RBC AUTO-ENTMCNC: 32.9 % (ref 33–37)
MCV RBC AUTO: 101.3 FL (ref 82–100)
PDW BLD-RTO: 14.6 % (ref 11.5–14.5)
PLATELET # BLD: 123 K/UL (ref 130–400)
POTASSIUM SERPL-SCNC: 4.4 MEQ/L (ref 3.5–5.1)
RBC # BLD: 3.46 M/UL (ref 4.2–5.4)
SODIUM BLD-SCNC: 139 MEQ/L (ref 132–144)
WBC # BLD: 3.6 K/UL (ref 4.8–10.8)

## 2017-12-05 ENCOUNTER — OFFICE VISIT (OUTPATIENT)
Dept: GERIATRIC MEDICINE | Age: 82
End: 2017-12-05

## 2017-12-05 VITALS
SYSTOLIC BLOOD PRESSURE: 136 MMHG | BODY MASS INDEX: 24.49 KG/M2 | HEART RATE: 63 BPM | WEIGHT: 147 LBS | RESPIRATION RATE: 18 BRPM | DIASTOLIC BLOOD PRESSURE: 74 MMHG | TEMPERATURE: 98.2 F | OXYGEN SATURATION: 97 % | HEIGHT: 65 IN

## 2017-12-05 DIAGNOSIS — S72.001G CLOSED FRACTURE OF RIGHT HIP WITH DELAYED HEALING, SUBSEQUENT ENCOUNTER: ICD-10-CM

## 2017-12-05 DIAGNOSIS — R53.1 GENERALIZED WEAKNESS: Primary | ICD-10-CM

## 2017-12-05 DIAGNOSIS — D64.9 ANEMIA, UNSPECIFIED TYPE: ICD-10-CM

## 2017-12-05 PROCEDURE — 99308 SBSQ NF CARE LOW MDM 20: CPT | Performed by: NURSE PRACTITIONER

## 2017-12-06 LAB
ANION GAP SERPL CALCULATED.3IONS-SCNC: 12 MEQ/L (ref 7–13)
ANISOCYTOSIS: 0
ATYPICAL LYMPHOCYTE RELATIVE PERCENT: 10 %
BASOPHILS ABSOLUTE: 0 K/UL (ref 0–0.2)
BASOPHILS RELATIVE PERCENT: 1 %
BUN BLDV-MCNC: 19 MG/DL (ref 8–23)
CALCIUM SERPL-MCNC: 9.2 MG/DL (ref 8.6–10.2)
CHLORIDE BLD-SCNC: 103 MEQ/L (ref 98–107)
CO2: 27 MEQ/L (ref 22–29)
CREAT SERPL-MCNC: 0.63 MG/DL (ref 0.5–0.9)
EOSINOPHILS ABSOLUTE: 0.1 K/UL (ref 0–0.7)
EOSINOPHILS RELATIVE PERCENT: 2 %
GFR AFRICAN AMERICAN: >60
GFR NON-AFRICAN AMERICAN: >60
GLUCOSE BLD-MCNC: 85 MG/DL (ref 74–109)
HCT VFR BLD CALC: 35.5 % (ref 37–47)
HEMOGLOBIN: 11.9 G/DL (ref 12–16)
HYPOCHROMIA: 0
LYMPHOCYTES ABSOLUTE: 0.8 K/UL (ref 1–4.8)
LYMPHOCYTES RELATIVE PERCENT: 11 %
MACROCYTES: ABNORMAL
MCH RBC QN AUTO: 33.6 PG (ref 27–31.3)
MCHC RBC AUTO-ENTMCNC: 33.4 % (ref 33–37)
MCV RBC AUTO: 100.4 FL (ref 82–100)
MICROCYTES: 0
MONOCYTES ABSOLUTE: 0.6 K/UL (ref 0.2–0.8)
MONOCYTES RELATIVE PERCENT: 15.4 %
NEUTROPHILS ABSOLUTE: 2.4 K/UL (ref 1.4–6.5)
NEUTROPHILS RELATIVE PERCENT: 62 %
PDW BLD-RTO: 14.1 % (ref 11.5–14.5)
PLATELET # BLD: 125 K/UL (ref 130–400)
PLATELET SLIDE REVIEW: ADEQUATE
POIKILOCYTES: 0
POLYCHROMASIA: 0
POTASSIUM SERPL-SCNC: 4.8 MEQ/L (ref 3.5–5.1)
RBC # BLD: 3.54 M/UL (ref 4.2–5.4)
SMUDGE CELLS: 6.7
SODIUM BLD-SCNC: 142 MEQ/L (ref 132–144)
WBC # BLD: 3.8 K/UL (ref 4.8–10.8)

## 2017-12-11 ENCOUNTER — CARE COORDINATION (OUTPATIENT)
Dept: CASE MANAGEMENT | Age: 82
End: 2017-12-11

## 2017-12-11 NOTE — CARE COORDINATION
Received a call back regarding patient therapy progress from Sherri. She reports patient is not participating in therapy d/t NWB status to RT hip. Patient may possibly re-do hipping to rt hip. ADL's grooming independent, upper body dressing min assist, lower body max assist, toileting mod assist, bathing total assist. Discharge plan to be TBD. Family/son very involved. Post acute care transition coordinator will continue to follow.  TOYA Rocha RN  Care Transition Coordinator  [phone]

## 2017-12-11 NOTE — CARE COORDINATION
Called skilled nursing [facility] [address] [facility]  for a patient update. Nurse Simon Schrader reports no acute concerns, appointment upcoming with Dr. Asha Glez. LM with [facility] Robert for rehab manager, Beatrice Fung to return the call, contact information provided. Post acute transition coordinator will continue to follow.  ROSA JavierN RN Care Transition Coordinator  [phone]

## 2017-12-19 ENCOUNTER — OFFICE VISIT (OUTPATIENT)
Dept: GERIATRIC MEDICINE | Age: 82
End: 2017-12-19

## 2017-12-19 ENCOUNTER — CARE COORDINATION (OUTPATIENT)
Dept: CASE MANAGEMENT | Age: 82
End: 2017-12-19

## 2017-12-19 DIAGNOSIS — R53.1 GENERALIZED WEAKNESS: ICD-10-CM

## 2017-12-19 DIAGNOSIS — K59.00 CONSTIPATION, UNSPECIFIED CONSTIPATION TYPE: Primary | ICD-10-CM

## 2017-12-19 PROCEDURE — 99308 SBSQ NF CARE LOW MDM 20: CPT | Performed by: NURSE PRACTITIONER

## 2017-12-19 RX ORDER — DOCUSATE SODIUM 100 MG/1
100 CAPSULE, LIQUID FILLED ORAL 2 TIMES DAILY
Qty: 60 CAPSULE | Refills: 0
Start: 2017-12-19 | End: 2017-12-28 | Stop reason: SDUPTHER

## 2017-12-19 ASSESSMENT — ENCOUNTER SYMPTOMS
SHORTNESS OF BREATH: 0
COUGH: 0
CONSTIPATION: 1
WHEEZING: 0
ABDOMINAL PAIN: 0
COLOR CHANGE: 0

## 2017-12-19 NOTE — CARE COORDINATION
Called [facility] facility for an update. LVM for JUANIS Farley to return call regarding discharge plan. TOYA Harris RN  Care Transition Coordinator  [phone]    Called [facility] facility back to speak with therapy. Per OT Mariel Campoverde, patient is a rashaun, has Drs. Orders to discontinue therapy PT/OT until after procedure to re-do hip pinning on 12/22/17. TOYA Harris RN  Care Transition Coordinator  [phone]     Called [facility] facility for an update. LVM for nursing to return call regarding nursing update. TOYA Harris RN  Care Transition Coordinator  [phone]      Received a call back from [facility] confirming patient status. LVM for Valentine Abdullahi rehab manager.  TOYA Harris RN  Care Transition Coordinator  [phone]

## 2017-12-19 NOTE — PROGRESS NOTES
[address]  [phone] Fax    Subjective  Mary Acuña, 80 y.o. female presents today with:  Chief Complaint   Patient presents with    Check-Up     HPI  Patient seen today for routine follow-up. She is having surgery on Friday with Dr. Shereen Concepcion for right hip revision. She remains nonweightbearing and wheelchair-bound until then. She denies any pain in the hip. Main complaint today is constipation, and she states that she's having a lot of trouble moving her bowels on the bedpan. She is currently on MiraLAX and Colace once daily, which we will increase. Review of Systems   Constitutional: Positive for fatigue. Negative for chills and fever. Respiratory: Negative for cough, shortness of breath and wheezing. Cardiovascular: Negative for chest pain, palpitations and leg swelling. Gastrointestinal: Positive for constipation. Negative for abdominal pain. Musculoskeletal: Positive for gait problem. Negative for arthralgias and joint swelling. Skin: Negative for color change. Allergies   Allergen Reactions    Bactrim [Sulfamethoxazole-Trimethoprim] Rash     Rash of arms and legs      Medications reviewed at facility. Objective  Vitals:    12/19/17 1002   BP: 123/72   Pulse: 67   Resp: 18   Temp: 98.1 °F (36.7 °C)   SpO2: 98%   Weight: 146 lb (66.2 kg)   Height: 5' 5\" (1.651 m)     Physical Exam   Constitutional: She is well-developed, well-nourished, and in no distress. Cardiovascular: Normal rate, regular rhythm and normal heart sounds. No murmur heard. No lower extremity edema   Pulmonary/Chest: Effort normal and breath sounds normal. She has no wheezes. Abdominal: Soft. Bowel sounds are normal. She exhibits no distension. There is no tenderness. Musculoskeletal:        Right hip: She exhibits decreased range of motion. She exhibits no tenderness and no swelling.         Left hip: Normal.        Right knee: She exhibits

## 2017-12-20 VITALS
WEIGHT: 146 LBS | OXYGEN SATURATION: 98 % | SYSTOLIC BLOOD PRESSURE: 123 MMHG | RESPIRATION RATE: 18 BRPM | TEMPERATURE: 98.1 F | DIASTOLIC BLOOD PRESSURE: 72 MMHG | HEART RATE: 67 BPM | BODY MASS INDEX: 24.32 KG/M2 | HEIGHT: 65 IN

## 2017-12-28 RX ORDER — SPIRONOLACTONE 25 MG/1
25 TABLET ORAL DAILY
COMMUNITY
End: 2018-11-07 | Stop reason: SDUPTHER

## 2017-12-28 RX ORDER — L. ACIDOPHILUS/L.BULGARICUS 1MM CELL
1 TABLET ORAL 3 TIMES DAILY
COMMUNITY
End: 2018-02-28

## 2017-12-28 RX ORDER — DOXYCYCLINE 100 MG/1
100 TABLET ORAL DAILY
COMMUNITY
End: 2018-04-18 | Stop reason: SDUPTHER

## 2017-12-28 RX ORDER — CHOLECALCIFEROL (VITAMIN D3) 125 MCG
5 CAPSULE ORAL NIGHTLY
COMMUNITY

## 2017-12-28 RX ORDER — OXYCODONE HYDROCHLORIDE AND ACETAMINOPHEN 5; 325 MG/1; MG/1
1-2 TABLET ORAL EVERY 4 HOURS PRN
COMMUNITY
End: 2018-01-23 | Stop reason: ALTCHOICE

## 2017-12-29 ENCOUNTER — CARE COORDINATION (OUTPATIENT)
Dept: CASE MANAGEMENT | Age: 82
End: 2017-12-29

## 2018-01-19 ENCOUNTER — CARE COORDINATION (OUTPATIENT)
Dept: CASE MANAGEMENT | Age: 83
End: 2018-01-19

## 2018-01-19 NOTE — CARE COORDINATION
Called [facility] inquiring about best contact for patient information, Barb Robbins transferred this nurse to [facility] to [facility] introducing self, role and nature of call, my contact information provided. Post acute transition coordinator will continue to follow. TOYA Mcghee RN [address] [place] [address] Transition Coordinator  [phone]     2nd attempt/Emailed template, explanation of information requested for a patient update to Kai Queen. [facility] rehab director with [facility] [employer] self. Will continue to monitor.  TOYA Mcghee RN  Care Transition Coordinator  [phone]

## 2018-01-23 ENCOUNTER — OFFICE VISIT (OUTPATIENT)
Dept: GERIATRIC MEDICINE | Age: 83
End: 2018-01-23
Payer: MEDICARE

## 2018-01-23 DIAGNOSIS — R53.1 GENERALIZED WEAKNESS: ICD-10-CM

## 2018-01-23 DIAGNOSIS — D64.9 ANEMIA, UNSPECIFIED TYPE: ICD-10-CM

## 2018-01-23 DIAGNOSIS — M25.551 RIGHT HIP PAIN: Primary | ICD-10-CM

## 2018-01-23 PROCEDURE — 99308 SBSQ NF CARE LOW MDM 20: CPT | Performed by: NURSE PRACTITIONER

## 2018-01-23 ASSESSMENT — ENCOUNTER SYMPTOMS
COUGH: 0
CONSTIPATION: 0
WHEEZING: 0
COLOR CHANGE: 0
ABDOMINAL PAIN: 0
SHORTNESS OF BREATH: 0

## 2018-01-23 NOTE — PROGRESS NOTES
[address]  [phone] Fax    Subjective  Feroz Sewell, 80 y.o. female presents today with:  Chief Complaint   Patient presents with   Tanna CASTILLO  Patient is seen today for follow-up of right hip pain, status post hip revision with Dr. Rigoberto Bliss. She reports that she is having no hip pain, and is agreeable to stopping the Percocet. Her incision is healing well, staples are out, and she is toe-touch weightbearing on the right leg. She is transferring well with the assistance of 2 people. She is nervous about the healing of the hip, and is looking forward to an appointment with her orthopedic surgeon next week for report. Last labs were in December, when she had mild anemia. We'll repeat labs in the morning. Review of Systems   Constitutional: Positive for fatigue. Negative for chills and fever. Respiratory: Negative for cough, shortness of breath and wheezing. Cardiovascular: Negative for chest pain, palpitations and leg swelling. Gastrointestinal: Negative for abdominal pain and constipation. Musculoskeletal: Positive for gait problem. Negative for arthralgias and joint swelling. Skin: Positive for wound. Negative for color change. Allergies   Allergen Reactions    Bactrim [Sulfamethoxazole-Trimethoprim] Rash     Rash of arms and legs     Medications reviewed at facility. Objective  Vitals:    01/23/18 1014   BP: 118/70   Pulse: 73   Resp: 18   Temp: 98 °F (36.7 °C)   SpO2: 97%   Weight: 144 lb (65.3 kg)   Height: 5' 5\" (1.651 m)     Physical Exam   Constitutional: She is well-developed, well-nourished, and in no distress. Cardiovascular: Normal rate, regular rhythm and normal heart sounds. No murmur heard. No lower extremity edema   Pulmonary/Chest: Effort normal and breath sounds normal. She has no wheezes. Abdominal: Soft. Bowel sounds are normal. There is no tenderness.    Musculoskeletal:        Right hip: She exhibits decreased range of motion and swelling (slight). She exhibits no tenderness. Left hip: Normal.   Skin:          Assessment & Plan   1. Right hip pain      Improved, stop Percocet. Has follow up with ortho next week. 2. Anemia, unspecified type      Monitoring, check CBC in the AM   3. Generalized weakness      Improving, transferring with assist of 2. Continue therapies. Medications Discontinued During This Encounter   Medication Reason    oxyCODONE-acetaminophen (PERCOCET) 5-325 MG per tablet Therapy completed     Medications reviewed and updated  Encourage fluid intake, exercise as tolerated, and good nutrition. Continue therapies, and continue to stress fall prevention strategies. Return for follow up as needed while in skilled rehab.     Eben Mahoney, CNP

## 2018-01-24 VITALS
DIASTOLIC BLOOD PRESSURE: 70 MMHG | HEART RATE: 73 BPM | OXYGEN SATURATION: 97 % | RESPIRATION RATE: 18 BRPM | TEMPERATURE: 98 F | WEIGHT: 144 LBS | BODY MASS INDEX: 23.99 KG/M2 | HEIGHT: 65 IN | SYSTOLIC BLOOD PRESSURE: 118 MMHG

## 2018-01-24 LAB
ANION GAP SERPL CALCULATED.3IONS-SCNC: 10 MEQ/L (ref 7–13)
BUN BLDV-MCNC: 21 MG/DL (ref 8–23)
CALCIUM SERPL-MCNC: 9 MG/DL (ref 8.6–10.2)
CHLORIDE BLD-SCNC: 100 MEQ/L (ref 98–107)
CO2: 27 MEQ/L (ref 22–29)
CREAT SERPL-MCNC: 0.48 MG/DL (ref 0.5–0.9)
GFR AFRICAN AMERICAN: >60
GFR NON-AFRICAN AMERICAN: >60
GLUCOSE BLD-MCNC: 78 MG/DL (ref 74–109)
HCT VFR BLD CALC: 34.2 % (ref 37–47)
HEMOGLOBIN: 11 G/DL (ref 12–16)
MCH RBC QN AUTO: 31.6 PG (ref 27–31.3)
MCHC RBC AUTO-ENTMCNC: 32.2 % (ref 33–37)
MCV RBC AUTO: 98.4 FL (ref 82–100)
PDW BLD-RTO: 16.7 % (ref 11.5–14.5)
PLATELET # BLD: 137 K/UL (ref 130–400)
POTASSIUM SERPL-SCNC: 4.4 MEQ/L (ref 3.5–5.1)
RBC # BLD: 3.48 M/UL (ref 4.2–5.4)
SODIUM BLD-SCNC: 137 MEQ/L (ref 132–144)
WBC # BLD: 4.1 K/UL (ref 4.8–10.8)

## 2018-01-26 RX ORDER — SPIRONOLACTONE 25 MG/1
TABLET ORAL
Qty: 90 TABLET | Refills: 1 | Status: SHIPPED | OUTPATIENT
Start: 2018-01-26 | End: 2018-02-18 | Stop reason: SDUPTHER

## 2018-02-01 ENCOUNTER — CARE COORDINATION (OUTPATIENT)
Dept: CASE MANAGEMENT | Age: 83
End: 2018-02-01

## 2018-02-16 ENCOUNTER — OFFICE VISIT (OUTPATIENT)
Dept: GERIATRIC MEDICINE | Age: 83
End: 2018-02-16
Payer: MEDICARE

## 2018-02-16 DIAGNOSIS — T84.59XD CHRONIC INFECTION OF KNEE JOINT PROSTHESIS, SUBSEQUENT ENCOUNTER: ICD-10-CM

## 2018-02-16 DIAGNOSIS — Z96.659 CHRONIC INFECTION OF KNEE JOINT PROSTHESIS, SUBSEQUENT ENCOUNTER: ICD-10-CM

## 2018-02-16 DIAGNOSIS — R53.1 GENERALIZED WEAKNESS: ICD-10-CM

## 2018-02-16 DIAGNOSIS — G47.00 INSOMNIA, UNSPECIFIED TYPE: ICD-10-CM

## 2018-02-16 DIAGNOSIS — M25.551 RIGHT HIP PAIN: Primary | ICD-10-CM

## 2018-02-16 PROCEDURE — 99316 NF DSCHRG MGMT 30 MIN+: CPT | Performed by: NURSE PRACTITIONER

## 2018-02-16 RX ORDER — ACETAMINOPHEN 325 MG/1
650 TABLET ORAL EVERY 4 HOURS PRN
COMMUNITY
End: 2018-05-18 | Stop reason: CLARIF

## 2018-02-19 ENCOUNTER — CARE COORDINATION (OUTPATIENT)
Dept: CASE MANAGEMENT | Age: 83
End: 2018-02-19

## 2018-02-20 ENCOUNTER — TELEPHONE (OUTPATIENT)
Dept: PHARMACY | Facility: CLINIC | Age: 83
End: 2018-02-20

## 2018-02-20 DIAGNOSIS — Z96.641 PRESENCE OF RIGHT ARTIFICIAL HIP JOINT: Primary | ICD-10-CM

## 2018-02-20 PROCEDURE — 1111F DSCHRG MED/CURRENT MED MERGE: CPT | Performed by: PHARMACIST

## 2018-02-21 VITALS
OXYGEN SATURATION: 97 % | BODY MASS INDEX: 24.16 KG/M2 | WEIGHT: 145 LBS | SYSTOLIC BLOOD PRESSURE: 134 MMHG | HEART RATE: 70 BPM | HEIGHT: 65 IN | DIASTOLIC BLOOD PRESSURE: 62 MMHG | RESPIRATION RATE: 18 BRPM

## 2018-02-21 RX ORDER — BISACODYL 10 MG
10 SUPPOSITORY, RECTAL RECTAL DAILY PRN
COMMUNITY

## 2018-02-21 RX ORDER — POLYETHYLENE GLYCOL 3350 17 G/17G
17 POWDER, FOR SOLUTION ORAL DAILY
COMMUNITY
Start: 2018-02-16 | End: 2018-11-07 | Stop reason: SDUPTHER

## 2018-02-21 RX ORDER — DOCUSATE SODIUM 100 MG/1
100 CAPSULE, LIQUID FILLED ORAL EVERY MORNING
COMMUNITY

## 2018-02-21 ASSESSMENT — ENCOUNTER SYMPTOMS
WHEEZING: 0
COLOR CHANGE: 0
ABDOMINAL PAIN: 0
COUGH: 0
CONSTIPATION: 0
SHORTNESS OF BREATH: 0

## 2018-02-21 NOTE — PROGRESS NOTES
Medication Sig Dispense Refill    acetaminophen (TYLENOL) 325 MG tablet Take 650 mg by mouth every 4 hours as needed for Pain      doxycycline monohydrate (ADOXA) 100 MG tablet Take 100 mg by mouth daily      lactobacillus acidophilus (FLORANEX) Take 1 tablet by mouth 3 times daily      melatonin 5 MG TABS tablet Take 5 mg by mouth nightly      spironolactone (ALDACTONE) 25 MG tablet Take 25 mg by mouth daily      [DISCONTINUED] spironolactone (ALDACTONE) 25 MG tablet TAKE 1 TABLET DAILY 90 tablet 1    [DISCONTINUED] enoxaparin (LOVENOX) 40 MG/0.4ML injection Inject into the skin daily       No facility-administered encounter medications on file as of 2/16/2018. Objective  Vitals:    02/16/18 1607   BP: 134/62   Pulse: 70   Resp: 18   SpO2: 97%   Weight: 145 lb (65.8 kg)   Height: 5' 5\" (1.651 m)     Physical Exam   Constitutional: She is well-developed, well-nourished, and in no distress. Cardiovascular: Normal rate, regular rhythm and normal heart sounds. No murmur heard. No lower extremity edema   Pulmonary/Chest: Effort normal and breath sounds normal. She has no wheezes. Abdominal: Soft. Bowel sounds are normal. There is no tenderness. Musculoskeletal:        Right hip: She exhibits decreased range of motion. She exhibits no tenderness and no swelling. Left hip: Normal.   General weakness   Skin:        Psychiatric: Mood, memory and affect normal.     Assessment & Plan   1. Right hip pain      Resolved, follow up with ortho as scheduled. 2. Generalized weakness      Improving, continue [facility] PT/OT   3. Chronic infection of knee joint prosthesis, subsequent encounter      Stable; on longterm treatment with Doxy    4.  Insomnia, unspecified type      Stable; on melatonin     Medications Discontinued During This Encounter   Medication Reason    enoxaparin (LOVENOX) 40 MG/0.4ML injection     spironolactone (ALDACTONE) 25 MG tablet Duplicate Order     Medications reviewed and

## 2018-02-28 ENCOUNTER — OFFICE VISIT (OUTPATIENT)
Dept: FAMILY MEDICINE CLINIC | Age: 83
End: 2018-02-28
Payer: MEDICARE

## 2018-02-28 VITALS
HEART RATE: 80 BPM | HEIGHT: 65 IN | SYSTOLIC BLOOD PRESSURE: 118 MMHG | RESPIRATION RATE: 16 BRPM | TEMPERATURE: 97.5 F | DIASTOLIC BLOOD PRESSURE: 76 MMHG

## 2018-02-28 DIAGNOSIS — R27.0 ATAXIA: ICD-10-CM

## 2018-02-28 DIAGNOSIS — R53.1 WEAKNESS: ICD-10-CM

## 2018-02-28 DIAGNOSIS — D69.6 THROMBOCYTOPENIA (HCC): ICD-10-CM

## 2018-02-28 DIAGNOSIS — Z91.81 AT HIGH RISK FOR FALLS: ICD-10-CM

## 2018-02-28 DIAGNOSIS — I87.2 VENOUS INSUFFICIENCY: ICD-10-CM

## 2018-02-28 DIAGNOSIS — S61.411A LACERATION OF RIGHT HAND, FOREIGN BODY PRESENCE UNSPECIFIED, INITIAL ENCOUNTER: Primary | ICD-10-CM

## 2018-02-28 DIAGNOSIS — I34.0 NON-RHEUMATIC MITRAL REGURGITATION: ICD-10-CM

## 2018-02-28 DIAGNOSIS — R73.9 HYPERGLYCEMIA: ICD-10-CM

## 2018-02-28 DIAGNOSIS — Z98.890 STATUS POST HIP SURGERY: ICD-10-CM

## 2018-02-28 PROCEDURE — 3288F FALL RISK ASSESSMENT DOCD: CPT | Performed by: FAMILY MEDICINE

## 2018-02-28 PROCEDURE — 99214 OFFICE O/P EST MOD 30 MIN: CPT | Performed by: FAMILY MEDICINE

## 2018-02-28 NOTE — PATIENT INSTRUCTIONS
Thank you for enrolling in [address] Please follow the instructions below to securely access your online medical record. [employer] allows you to send messages to your doctor, view your test results, renew your prescriptions, schedule appointments, and more. How Do I Sign Up? 1. In your Internet browser, go to [email or web address]  2. Click on the Sign Up Now link in the Sign In box. You will see the New Member Sign Up page. 3. Enter your [employer] Access Code exactly as it appears below. You will not need to use this code after youve completed the sign-up process. If you do not sign up before the expiration date, you must request a new code. [employer] Access Code: [email or web address]  Expires: 4/29/2018  3:58 PM    4. Enter your Social Security Number ([id number]) and Date of Birth (mm/dd/yyyy) as indicated and click Submit. You will be taken to the next sign-up page. 5. Create a [employer] ID. This will be your [employer] login ID and cannot be changed, so think of one that is secure and easy to remember. 6. Create a [employer] password. You can change your password at any time. 7. Enter your Password Reset Question and Answer. This can be used at a later time if you forget your password. 8. Enter your e-mail address. You will receive e-mail notification when new information is available in [address] 9. Click Sign Up. You can now view your medical record. Additional Information  If you have questions, please contact your physician practice where you receive care. Remember, [employer] is NOT to be used for urgent needs. For medical emergencies, dial 911.

## 2018-03-01 ENCOUNTER — NURSE ONLY (OUTPATIENT)
Dept: FAMILY MEDICINE CLINIC | Age: 83
End: 2018-03-01
Payer: MEDICARE

## 2018-03-01 DIAGNOSIS — R30.0 DYSURIA: ICD-10-CM

## 2018-03-01 DIAGNOSIS — R30.0 DYSURIA: Primary | ICD-10-CM

## 2018-03-01 LAB
BILIRUBIN, POC: NORMAL
BLOOD URINE, POC: NORMAL
CLARITY, POC: NORMAL
COLOR, POC: NORMAL
GLUCOSE URINE, POC: NORMAL
KETONES, POC: NORMAL
LEUKOCYTE EST, POC: NORMAL
NITRITE, POC: NORMAL
PH, POC: 6
PROTEIN, POC: NORMAL
SPECIFIC GRAVITY, POC: 1.03
UROBILINOGEN, POC: NORMAL

## 2018-03-01 PROCEDURE — 81003 URINALYSIS AUTO W/O SCOPE: CPT | Performed by: FAMILY MEDICINE

## 2018-03-03 ENCOUNTER — TELEPHONE (OUTPATIENT)
Dept: FAMILY MEDICINE CLINIC | Age: 83
End: 2018-03-03

## 2018-03-03 RX ORDER — CIPROFLOXACIN 250 MG/1
250 TABLET, FILM COATED ORAL 2 TIMES DAILY
Qty: 10 TABLET | Refills: 0 | Status: SHIPPED | OUTPATIENT
Start: 2018-03-03 | End: 2018-03-08

## 2018-03-04 LAB
ORGANISM: ABNORMAL
URINE CULTURE, ROUTINE: ABNORMAL
URINE CULTURE, ROUTINE: ABNORMAL

## 2018-03-16 ENCOUNTER — NURSE ONLY (OUTPATIENT)
Dept: FAMILY MEDICINE CLINIC | Age: 83
End: 2018-03-16

## 2018-03-16 DIAGNOSIS — N39.0 URINARY TRACT INFECTION WITHOUT HEMATURIA, SITE UNSPECIFIED: Primary | ICD-10-CM

## 2018-03-16 DIAGNOSIS — N39.0 URINARY TRACT INFECTION WITHOUT HEMATURIA, SITE UNSPECIFIED: ICD-10-CM

## 2018-03-18 LAB — URINE CULTURE, ROUTINE: NORMAL

## 2018-04-17 ENCOUNTER — TELEPHONE (OUTPATIENT)
Dept: FAMILY MEDICINE CLINIC | Age: 83
End: 2018-04-17

## 2018-04-17 ENCOUNTER — OFFICE VISIT (OUTPATIENT)
Dept: INFECTIOUS DISEASES | Age: 83
End: 2018-04-17
Payer: MEDICARE

## 2018-04-17 VITALS
RESPIRATION RATE: 18 BRPM | BODY MASS INDEX: 25.66 KG/M2 | WEIGHT: 154 LBS | HEIGHT: 65 IN | TEMPERATURE: 97.3 F | DIASTOLIC BLOOD PRESSURE: 80 MMHG | SYSTOLIC BLOOD PRESSURE: 150 MMHG | HEART RATE: 82 BPM

## 2018-04-17 DIAGNOSIS — Z96.659 INFECTED PROSTHETIC KNEE JOINT, SUBSEQUENT ENCOUNTER: Primary | ICD-10-CM

## 2018-04-17 DIAGNOSIS — T84.59XD INFECTED PROSTHETIC KNEE JOINT, SUBSEQUENT ENCOUNTER: Primary | ICD-10-CM

## 2018-04-17 DIAGNOSIS — R23.8 PIMPLES: Primary | ICD-10-CM

## 2018-04-17 PROCEDURE — 99212 OFFICE O/P EST SF 10 MIN: CPT | Performed by: INTERNAL MEDICINE

## 2018-04-17 RX ORDER — DOXYCYCLINE HYCLATE 100 MG
100 TABLET ORAL 2 TIMES DAILY
Qty: 180 TABLET | Refills: 1 | Status: SHIPPED | OUTPATIENT
Start: 2018-04-17 | End: 2018-04-18 | Stop reason: SDUPTHER

## 2018-04-17 ASSESSMENT — ENCOUNTER SYMPTOMS: RESPIRATORY NEGATIVE: 1

## 2018-04-18 RX ORDER — DOXYCYCLINE HYCLATE 100 MG/1
CAPSULE ORAL
COMMUNITY
Start: 2018-02-28 | End: 2018-05-10

## 2018-04-19 ENCOUNTER — OFFICE VISIT (OUTPATIENT)
Dept: FAMILY MEDICINE CLINIC | Age: 83
End: 2018-04-19
Payer: MEDICARE

## 2018-04-19 ENCOUNTER — TELEPHONE (OUTPATIENT)
Dept: FAMILY MEDICINE CLINIC | Age: 83
End: 2018-04-19

## 2018-04-19 VITALS
WEIGHT: 154 LBS | TEMPERATURE: 98.7 F | DIASTOLIC BLOOD PRESSURE: 70 MMHG | HEIGHT: 65 IN | OXYGEN SATURATION: 95 % | SYSTOLIC BLOOD PRESSURE: 132 MMHG | RESPIRATION RATE: 14 BRPM | BODY MASS INDEX: 25.66 KG/M2 | HEART RATE: 80 BPM

## 2018-04-19 DIAGNOSIS — C44.311 BASAL CELL CARCINOMA OF NOSE: Primary | ICD-10-CM

## 2018-04-19 PROCEDURE — 99213 OFFICE O/P EST LOW 20 MIN: CPT | Performed by: NURSE PRACTITIONER

## 2018-04-19 RX ORDER — FLUOROURACIL 50 MG/ML
SOLUTION TOPICAL 2 TIMES DAILY
Qty: 1 BOTTLE | Refills: 0 | Status: SHIPPED | OUTPATIENT
Start: 2018-04-19

## 2018-04-19 RX ORDER — BACITRACIN ZINC, NEOMYCIN SULFATE, AND POLYMYXIN B SULFATE 400; 3.5; 5 [IU]/G; MG/G; [IU]/G
1 OINTMENT TOPICAL 2 TIMES DAILY
Qty: 28.3 G | Refills: 0 | Status: CANCELLED | OUTPATIENT
Start: 2018-04-19

## 2018-04-19 ASSESSMENT — ENCOUNTER SYMPTOMS
RESPIRATORY NEGATIVE: 1
SORE THROAT: 0
EYES NEGATIVE: 1
EYE PAIN: 0
ABDOMINAL PAIN: 0
WHEEZING: 0
COLOR CHANGE: 0
SINUS PAIN: 0
ALLERGIC/IMMUNOLOGIC NEGATIVE: 1
GASTROINTESTINAL NEGATIVE: 1
DIARRHEA: 0
NAUSEA: 0
VOMITING: 0
SHORTNESS OF BREATH: 0
COUGH: 0

## 2018-04-19 ASSESSMENT — PATIENT HEALTH QUESTIONNAIRE - PHQ9
SUM OF ALL RESPONSES TO PHQ QUESTIONS 1-9: 0
SUM OF ALL RESPONSES TO PHQ9 QUESTIONS 1 & 2: 0
1. LITTLE INTEREST OR PLEASURE IN DOING THINGS: 0
2. FEELING DOWN, DEPRESSED OR HOPELESS: 0

## 2018-04-23 ENCOUNTER — TELEPHONE (OUTPATIENT)
Dept: FAMILY MEDICINE CLINIC | Age: 83
End: 2018-04-23

## 2018-05-10 ENCOUNTER — OFFICE VISIT (OUTPATIENT)
Dept: FAMILY MEDICINE CLINIC | Age: 83
End: 2018-05-10
Payer: MEDICARE

## 2018-05-10 VITALS
DIASTOLIC BLOOD PRESSURE: 70 MMHG | TEMPERATURE: 98 F | RESPIRATION RATE: 14 BRPM | HEIGHT: 65 IN | SYSTOLIC BLOOD PRESSURE: 130 MMHG | HEART RATE: 65 BPM | OXYGEN SATURATION: 96 %

## 2018-05-10 DIAGNOSIS — H60.391 OTHER INFECTIVE ACUTE OTITIS EXTERNA OF RIGHT EAR: ICD-10-CM

## 2018-05-10 DIAGNOSIS — H66.001 ACUTE SUPPURATIVE OTITIS MEDIA OF RIGHT EAR WITHOUT SPONTANEOUS RUPTURE OF TYMPANIC MEMBRANE, RECURRENCE NOT SPECIFIED: ICD-10-CM

## 2018-05-10 DIAGNOSIS — H61.23 CERUMEN DEBRIS ON TYMPANIC MEMBRANE OF BOTH EARS: Primary | ICD-10-CM

## 2018-05-10 PROCEDURE — 69210 REMOVE IMPACTED EAR WAX UNI: CPT | Performed by: NURSE PRACTITIONER

## 2018-05-10 PROCEDURE — 99213 OFFICE O/P EST LOW 20 MIN: CPT | Performed by: NURSE PRACTITIONER

## 2018-05-10 RX ORDER — LEVOFLOXACIN 500 MG/1
500 TABLET, FILM COATED ORAL DAILY
Qty: 7 TABLET | Refills: 0 | Status: SHIPPED | OUTPATIENT
Start: 2018-05-10 | End: 2018-05-17

## 2018-05-10 ASSESSMENT — ENCOUNTER SYMPTOMS
SORE THROAT: 0
DIARRHEA: 0
ABDOMINAL PAIN: 0
COUGH: 0
VOMITING: 0
RHINORRHEA: 0

## 2018-05-16 ENCOUNTER — TELEPHONE (OUTPATIENT)
Dept: FAMILY MEDICINE CLINIC | Age: 83
End: 2018-05-16

## 2018-05-18 ENCOUNTER — OFFICE VISIT (OUTPATIENT)
Dept: FAMILY MEDICINE CLINIC | Age: 83
End: 2018-05-18
Payer: MEDICARE

## 2018-05-18 VITALS
WEIGHT: 158 LBS | RESPIRATION RATE: 16 BRPM | BODY MASS INDEX: 26.29 KG/M2 | OXYGEN SATURATION: 98 % | TEMPERATURE: 98.7 F | DIASTOLIC BLOOD PRESSURE: 58 MMHG | HEART RATE: 78 BPM | SYSTOLIC BLOOD PRESSURE: 132 MMHG

## 2018-05-18 DIAGNOSIS — Z13.0 SCREENING FOR BLOOD DISEASE: ICD-10-CM

## 2018-05-18 DIAGNOSIS — L98.9 SKIN LESION: ICD-10-CM

## 2018-05-18 DIAGNOSIS — I10 ESSENTIAL HYPERTENSION: ICD-10-CM

## 2018-05-18 DIAGNOSIS — R73.9 HYPERGLYCEMIA: ICD-10-CM

## 2018-05-18 DIAGNOSIS — G62.9 PERIPHERAL POLYNEUROPATHY: ICD-10-CM

## 2018-05-18 DIAGNOSIS — K64.9 HEMORRHOIDS, UNSPECIFIED HEMORRHOID TYPE: ICD-10-CM

## 2018-05-18 DIAGNOSIS — G62.9 PERIPHERAL POLYNEUROPATHY: Primary | ICD-10-CM

## 2018-05-18 LAB
ALBUMIN SERPL-MCNC: 3.7 G/DL (ref 3.9–4.9)
ALP BLD-CCNC: 155 U/L (ref 40–130)
ALT SERPL-CCNC: 12 U/L (ref 0–33)
ANION GAP SERPL CALCULATED.3IONS-SCNC: 12 MEQ/L (ref 7–13)
AST SERPL-CCNC: 23 U/L (ref 0–35)
BILIRUB SERPL-MCNC: 0.4 MG/DL (ref 0–1.2)
BUN BLDV-MCNC: 34 MG/DL (ref 8–23)
CALCIUM SERPL-MCNC: 9.3 MG/DL (ref 8.6–10.2)
CHLORIDE BLD-SCNC: 101 MEQ/L (ref 98–107)
CO2: 25 MEQ/L (ref 22–29)
CREAT SERPL-MCNC: 0.66 MG/DL (ref 0.5–0.9)
GFR AFRICAN AMERICAN: >60
GFR NON-AFRICAN AMERICAN: >60
GLOBULIN: 3.8 G/DL (ref 2.3–3.5)
GLUCOSE BLD-MCNC: 86 MG/DL (ref 74–109)
HBA1C MFR BLD: 5.6 % (ref 4.8–5.9)
MAGNESIUM: 2.1 MG/DL (ref 1.7–2.3)
POTASSIUM SERPL-SCNC: 4.3 MEQ/L (ref 3.5–5.1)
SODIUM BLD-SCNC: 138 MEQ/L (ref 132–144)
T4 FREE: 1.15 NG/DL (ref 0.93–1.7)
TOTAL PROTEIN: 7.5 G/DL (ref 6.4–8.1)
TSH REFLEX: 3.63 UIU/ML (ref 0.27–4.2)
VITAMIN B-12: 627 PG/ML (ref 232–1245)

## 2018-05-18 PROCEDURE — 99214 OFFICE O/P EST MOD 30 MIN: CPT | Performed by: INTERNAL MEDICINE

## 2018-05-18 RX ORDER — BENZONATATE 100 MG/1
100 CAPSULE ORAL 3 TIMES DAILY PRN
COMMUNITY

## 2018-05-18 ASSESSMENT — ENCOUNTER SYMPTOMS
EYE PAIN: 0
BACK PAIN: 0
SHORTNESS OF BREATH: 0
ABDOMINAL PAIN: 0

## 2018-05-21 RX ORDER — GABAPENTIN 300 MG/1
300 CAPSULE ORAL 3 TIMES DAILY
Qty: 90 CAPSULE | Refills: 0 | Status: SHIPPED | OUTPATIENT
Start: 2018-05-21 | End: 2018-05-22 | Stop reason: SINTOL

## 2018-05-21 RX ORDER — GABAPENTIN 300 MG/1
300 CAPSULE ORAL 3 TIMES DAILY
Qty: 90 CAPSULE | Refills: 0 | Status: SHIPPED | OUTPATIENT
Start: 2018-05-21 | End: 2018-05-21 | Stop reason: SDUPTHER

## 2018-05-22 ENCOUNTER — TELEPHONE (OUTPATIENT)
Dept: FAMILY MEDICINE CLINIC | Age: 83
End: 2018-05-22

## 2018-05-22 DIAGNOSIS — G62.9 NEUROPATHY: Primary | ICD-10-CM

## 2018-05-23 RX ORDER — PREGABALIN 25 MG/1
25 CAPSULE ORAL 2 TIMES DAILY
Qty: 14 CAPSULE | Refills: 0 | Status: SHIPPED | OUTPATIENT
Start: 2018-05-23 | End: 2018-06-11

## 2018-05-23 RX ORDER — PREGABALIN 25 MG/1
25 CAPSULE ORAL 2 TIMES DAILY
Qty: 14 CAPSULE | Refills: 0 | Status: SHIPPED | OUTPATIENT
Start: 2018-05-23 | End: 2018-05-23 | Stop reason: SDUPTHER

## 2018-05-30 ENCOUNTER — TELEPHONE (OUTPATIENT)
Dept: FAMILY MEDICINE CLINIC | Age: 83
End: 2018-05-30

## 2018-05-30 RX ORDER — ASPIRIN 81 MG
1 TABLET,CHEWABLE ORAL 2 TIMES DAILY
Qty: 1 TUBE | Refills: 2 | Status: SHIPPED | OUTPATIENT
Start: 2018-05-30

## 2018-06-06 ENCOUNTER — APPOINTMENT (OUTPATIENT)
Dept: GENERAL RADIOLOGY | Age: 83
End: 2018-06-06
Payer: MEDICARE

## 2018-06-06 ENCOUNTER — APPOINTMENT (OUTPATIENT)
Dept: CT IMAGING | Age: 83
End: 2018-06-06
Payer: MEDICARE

## 2018-06-06 ENCOUNTER — HOSPITAL ENCOUNTER (EMERGENCY)
Age: 83
Discharge: NURSING, REHAB OR LONG-TERM CARE | End: 2018-06-06
Payer: MEDICARE

## 2018-06-06 VITALS
WEIGHT: 150 LBS | DIASTOLIC BLOOD PRESSURE: 66 MMHG | BODY MASS INDEX: 24.11 KG/M2 | SYSTOLIC BLOOD PRESSURE: 161 MMHG | OXYGEN SATURATION: 100 % | HEART RATE: 65 BPM | HEIGHT: 66 IN | TEMPERATURE: 98.1 F | RESPIRATION RATE: 18 BRPM
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
## 2018-06-06 DIAGNOSIS — M25.561 ACUTE PAIN OF RIGHT KNEE: ICD-10-CM

## 2018-06-06 DIAGNOSIS — S09.90XA INJURY OF HEAD, INITIAL ENCOUNTER: ICD-10-CM

## 2018-06-06 DIAGNOSIS — S01.21XA LACERATION OF NOSE, INITIAL ENCOUNTER: ICD-10-CM

## 2018-06-06 DIAGNOSIS — S02.2XXB OPEN FRACTURE OF NASAL BONE, INITIAL ENCOUNTER: Primary | ICD-10-CM

## 2018-06-06 PROCEDURE — 73562 X-RAY EXAM OF KNEE 3: CPT

## 2018-06-06 PROCEDURE — 70450 CT HEAD/BRAIN W/O DYE: CPT

## 2018-06-06 PROCEDURE — 72125 CT NECK SPINE W/O DYE: CPT

## 2018-06-06 PROCEDURE — 99284 EMERGENCY DEPT VISIT MOD MDM: CPT

## 2018-06-06 PROCEDURE — 6370000000 HC RX 637 (ALT 250 FOR IP): Performed by: PHYSICIAN ASSISTANT

## 2018-06-06 RX ORDER — CEPHALEXIN 500 MG/1
500 CAPSULE ORAL ONCE
Status: DISCONTINUED | OUTPATIENT
Start: 2018-06-06 | End: 2018-06-06 | Stop reason: HOSPADM

## 2018-06-06 RX ORDER — CEPHALEXIN 500 MG/1
500 CAPSULE ORAL 3 TIMES DAILY
Qty: 30 CAPSULE | Refills: 0 | Status: SHIPPED | OUTPATIENT
Start: 2018-06-06 | End: 2018-06-16

## 2018-06-06 RX ADMIN — Medication 3 ML: at 04:07

## 2018-06-06 ASSESSMENT — PAIN SCALES - GENERAL: PAINLEVEL_OUTOF10: 1

## 2018-06-06 ASSESSMENT — ENCOUNTER SYMPTOMS
ABDOMINAL PAIN: 0
VOMITING: 0
DIARRHEA: 0
SHORTNESS OF BREATH: 0
COUGH: 0
NAUSEA: 0

## 2018-06-06 ASSESSMENT — PAIN DESCRIPTION - LOCATION: LOCATION: NOSE

## 2018-06-06 ASSESSMENT — PAIN DESCRIPTION - ONSET: ONSET: SUDDEN

## 2018-06-06 ASSESSMENT — PAIN DESCRIPTION - FREQUENCY: FREQUENCY: CONTINUOUS

## 2018-06-06 ASSESSMENT — PAIN DESCRIPTION - DESCRIPTORS: DESCRIPTORS: TENDER;ACHING

## 2018-06-06 ASSESSMENT — PAIN DESCRIPTION - PAIN TYPE: TYPE: ACUTE PAIN

## 2018-06-11 ENCOUNTER — OFFICE VISIT (OUTPATIENT)
Dept: FAMILY MEDICINE CLINIC | Age: 83
End: 2018-06-11
Payer: MEDICARE

## 2018-06-11 VITALS
TEMPERATURE: 97.7 F | SYSTOLIC BLOOD PRESSURE: 128 MMHG | RESPIRATION RATE: 16 BRPM | HEART RATE: 76 BPM | OXYGEN SATURATION: 96 % | DIASTOLIC BLOOD PRESSURE: 68 MMHG
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
## 2018-06-11 DIAGNOSIS — S02.2XXS OPEN FRACTURE OF NASAL BONE, SEQUELA: Primary | ICD-10-CM

## 2018-06-11 DIAGNOSIS — G62.9 NEUROPATHY: ICD-10-CM

## 2018-06-11 PROCEDURE — 99214 OFFICE O/P EST MOD 30 MIN: CPT | Performed by: INTERNAL MEDICINE

## 2018-06-11 RX ORDER — GABAPENTIN 100 MG/1
100 CAPSULE ORAL DAILY
Qty: 21 CAPSULE | Refills: 1 | Status: SHIPPED | OUTPATIENT
Start: 2018-06-11 | End: 2018-08-28 | Stop reason: SDUPTHER

## 2018-06-11 ASSESSMENT — ENCOUNTER SYMPTOMS
BACK PAIN: 0
ABDOMINAL PAIN: 0
EYE PAIN: 0
SHORTNESS OF BREATH: 0

## 2018-06-19 ENCOUNTER — OFFICE VISIT (OUTPATIENT)
Dept: FAMILY MEDICINE CLINIC | Age: 83
End: 2018-06-19
Payer: MEDICARE

## 2018-06-19 VITALS
TEMPERATURE: 97.8 F | OXYGEN SATURATION: 95 % | RESPIRATION RATE: 14 BRPM | DIASTOLIC BLOOD PRESSURE: 76 MMHG | SYSTOLIC BLOOD PRESSURE: 124 MMHG | HEART RATE: 80 BPM

## 2018-06-19 DIAGNOSIS — R60.9 EDEMA, UNSPECIFIED TYPE: Primary | ICD-10-CM

## 2018-06-19 DIAGNOSIS — I10 ESSENTIAL HYPERTENSION: ICD-10-CM

## 2018-06-19 DIAGNOSIS — T84.53XD INFECTION ASSOCIATED WITH INTERNAL RIGHT KNEE PROSTHESIS, SUBSEQUENT ENCOUNTER: ICD-10-CM

## 2018-06-19 DIAGNOSIS — G62.9 NEUROPATHY: ICD-10-CM

## 2018-06-19 PROCEDURE — 99214 OFFICE O/P EST MOD 30 MIN: CPT | Performed by: INTERNAL MEDICINE

## 2018-06-19 RX ORDER — DOXYCYCLINE HYCLATE 100 MG
100 TABLET ORAL 2 TIMES DAILY
COMMUNITY
End: 2018-12-31 | Stop reason: SDUPTHER

## 2018-06-19 RX ORDER — GABAPENTIN 100 MG/1
100 CAPSULE ORAL 3 TIMES DAILY
Qty: 90 CAPSULE | Refills: 2 | Status: SHIPPED | OUTPATIENT
Start: 2018-06-19 | End: 2018-08-28 | Stop reason: CLARIF

## 2018-06-19 ASSESSMENT — ENCOUNTER SYMPTOMS
BACK PAIN: 0
SHORTNESS OF BREATH: 0
EYE PAIN: 0
ABDOMINAL PAIN: 0

## 2018-08-28 ENCOUNTER — OFFICE VISIT (OUTPATIENT)
Dept: FAMILY MEDICINE CLINIC | Age: 83
End: 2018-08-28
Payer: MEDICARE

## 2018-08-28 VITALS
WEIGHT: 155 LBS | HEIGHT: 65 IN | RESPIRATION RATE: 16 BRPM | HEART RATE: 76 BPM | BODY MASS INDEX: 25.83 KG/M2 | SYSTOLIC BLOOD PRESSURE: 122 MMHG | DIASTOLIC BLOOD PRESSURE: 72 MMHG | TEMPERATURE: 98.1 F

## 2018-08-28 DIAGNOSIS — R29.6 FREQUENT FALLS: ICD-10-CM

## 2018-08-28 DIAGNOSIS — D69.6 THROMBOCYTOPENIA (HCC): ICD-10-CM

## 2018-08-28 DIAGNOSIS — S12.100S CLOSED ODONTOID FRACTURE, SEQUELA: ICD-10-CM

## 2018-08-28 DIAGNOSIS — I10 ESSENTIAL HYPERTENSION: Primary | ICD-10-CM

## 2018-08-28 DIAGNOSIS — Z95.0 PACEMAKER: ICD-10-CM

## 2018-08-28 LAB
FERRITIN: 102.3 NG/ML (ref 13–150)
HCT VFR BLD CALC: 40.6 % (ref 37–47)
HEMOGLOBIN: 13.6 G/DL (ref 12–16)
MCH RBC QN AUTO: 33.1 PG (ref 27–31.3)
MCHC RBC AUTO-ENTMCNC: 33.6 % (ref 33–37)
MCV RBC AUTO: 98.7 FL (ref 82–100)
PDW BLD-RTO: 13.6 % (ref 11.5–14.5)
PLATELET # BLD: 112 K/UL (ref 130–400)
RBC # BLD: 4.11 M/UL (ref 4.2–5.4)
WBC # BLD: 3.9 K/UL (ref 4.8–10.8)

## 2018-08-28 PROCEDURE — 99214 OFFICE O/P EST MOD 30 MIN: CPT | Performed by: FAMILY MEDICINE

## 2018-08-28 NOTE — PROGRESS NOTES
06/11/18 128/68     Pulse Readings from Last 3 Encounters:   08/28/18 76   06/19/18 80   06/11/18 76       PHYSICAL EXAMINATION:        GENERAL:    The patient appears well nourished and well-developed,     Normal affect. Not appearing significantly anxious or depressed. No acute respiratory distress. Alert and oriented times 3. Skin:     No skin rashes. No concerning moles observed. Gait:    Normal gait w walker  No ataxia. HEENT:  Normocephalic, atraumatic. Throat:  Pharynx is clear, no erythema/ edema or exudates   Ears:    TMs normal bilaterally. Canals and ears normal   Eyes:  Extraocular eye motions intact and pain free. Pupils reactive/equal    Sclerae and conjunctivae clear    NECK: No masses or adenopathy palpable. No carotid bruits heard. No asymmetry visible. No thyromegaly. RESPIRATORY:   Clear/ Equal breath sounds /No acute respiratory distress. No wheezes,rales, or rhonchi. No percussive abnormalities    HEART: Regular rhythm without murmur, rub or gallop. ABDOMEN:  Soft, non tender. No masses, guarding or rebound. Normo active bowel sounds. EXTREMITIES: 1+ edema dpt rubor     No cyanosis or clubbing. 2+ dorsalis pedis pulses bilaterally      Mild bruising bridge of nose      Assessment & Plan    Diagnosis Orders   1. Essential hypertension     2. Pacemaker     3. Thrombocytopenia (HCC)  CBC    Ferritin   4. Frequent falls     5. Closed odontoid fracture, sequela       No orders of the defined types were placed in this encounter. No orders of the defined types were placed in this encounter. Medications Discontinued During This Encounter   Medication Reason    gabapentin (NEURONTIN) 708 MG capsule DUPLICATE     No Follow-up on file.   Brought up odontoid frx  She admits to falls  HOME safety tips given  Uses wheelchair also  Sees cardio qyr    Wicho Monroe MD

## 2018-11-07 RX ORDER — POLYETHYLENE GLYCOL 3350 17 G/17G
527 POWDER, FOR SOLUTION ORAL DAILY
Qty: 527 G | Refills: 1 | Status: SHIPPED | OUTPATIENT
Start: 2018-11-07 | End: 2018-11-12 | Stop reason: SDUPTHER

## 2018-11-07 RX ORDER — SPIRONOLACTONE 25 MG/1
25 TABLET ORAL DAILY
Qty: 90 TABLET | Refills: 2 | Status: SHIPPED | OUTPATIENT
Start: 2018-11-07 | End: 2018-11-07 | Stop reason: SDUPTHER

## 2018-11-07 RX ORDER — SPIRONOLACTONE 25 MG/1
TABLET ORAL
Qty: 90 TABLET | Refills: 1 | Status: SHIPPED | OUTPATIENT
Start: 2018-11-07

## 2018-11-12 RX ORDER — POLYETHYLENE GLYCOL 3350 17 G/17G
17 POWDER, FOR SOLUTION ORAL DAILY
Qty: 527 G | Refills: 1 | Status: SHIPPED | OUTPATIENT
Start: 2018-11-12

## 2018-12-18 ENCOUNTER — NURSE ONLY (OUTPATIENT)
Dept: FAMILY MEDICINE CLINIC | Age: 83
End: 2018-12-18
Payer: MEDICARE

## 2018-12-18 ENCOUNTER — TELEPHONE (OUTPATIENT)
Dept: FAMILY MEDICINE CLINIC | Age: 83
End: 2018-12-18

## 2018-12-18 DIAGNOSIS — R30.0 DYSURIA: ICD-10-CM

## 2018-12-18 DIAGNOSIS — R30.0 DYSURIA: Primary | ICD-10-CM

## 2018-12-18 LAB
BILIRUBIN, POC: NORMAL
BLOOD URINE, POC: NORMAL
CLARITY, POC: CLEAR
COLOR, POC: YELLOW
GLUCOSE URINE, POC: NORMAL
KETONES, POC: NORMAL
LEUKOCYTE EST, POC: NORMAL
NITRITE, POC: NORMAL
PH, POC: 6
PROTEIN, POC: NORMAL
SPECIFIC GRAVITY, POC: 1.03
UROBILINOGEN, POC: NORMAL

## 2018-12-18 PROCEDURE — 81003 URINALYSIS AUTO W/O SCOPE: CPT | Performed by: FAMILY MEDICINE

## 2018-12-21 LAB
ORGANISM: ABNORMAL
URINE CULTURE, ROUTINE: ABNORMAL
URINE CULTURE, ROUTINE: ABNORMAL

## 2018-12-21 RX ORDER — AMOXICILLIN 500 MG/1
1 TABLET, FILM COATED ORAL 3 TIMES DAILY
Qty: 30 TABLET | Refills: 0 | Status: SHIPPED | OUTPATIENT
Start: 2018-12-21 | End: 2018-12-31

## 2018-12-31 RX ORDER — DOXYCYCLINE HYCLATE 100 MG
100 TABLET ORAL 2 TIMES DAILY
Qty: 60 TABLET | Refills: 2 | Status: SHIPPED | OUTPATIENT
Start: 2018-12-31 | End: 2019-04-17 | Stop reason: SDUPTHER

## 2019-02-18 ENCOUNTER — OFFICE VISIT (OUTPATIENT)
Dept: FAMILY MEDICINE CLINIC | Age: 84
End: 2019-02-18
Payer: MEDICARE

## 2019-02-18 ENCOUNTER — HOSPITAL ENCOUNTER (EMERGENCY)
Age: 84
Discharge: HOME | End: 2019-02-18
Payer: MEDICARE

## 2019-02-18 ENCOUNTER — APPOINTMENT (OUTPATIENT)
Dept: CT IMAGING | Age: 84
End: 2019-02-18
Payer: MEDICARE

## 2019-02-18 VITALS
SYSTOLIC BLOOD PRESSURE: 166 MMHG | HEART RATE: 80 BPM | DIASTOLIC BLOOD PRESSURE: 74 MMHG | WEIGHT: 155 LBS | RESPIRATION RATE: 16 BRPM | TEMPERATURE: 98.4 F | BODY MASS INDEX: 25.83 KG/M2 | HEIGHT: 65 IN

## 2019-02-18 VITALS
TEMPERATURE: 98.4 F | OXYGEN SATURATION: 98 % | RESPIRATION RATE: 16 BRPM | SYSTOLIC BLOOD PRESSURE: 138 MMHG | HEART RATE: 76 BPM | DIASTOLIC BLOOD PRESSURE: 85 MMHG

## 2019-02-18 DIAGNOSIS — K64.4 BLEEDING EXTERNAL HEMORRHOIDS: Primary | ICD-10-CM

## 2019-02-18 DIAGNOSIS — G62.9 PERIPHERAL POLYNEUROPATHY: ICD-10-CM

## 2019-02-18 DIAGNOSIS — Z95.0 PACEMAKER: ICD-10-CM

## 2019-02-18 DIAGNOSIS — K92.2 LOWER GASTROINTESTINAL BLEED: ICD-10-CM

## 2019-02-18 DIAGNOSIS — K64.9 BLEEDING HEMORRHOID: Primary | ICD-10-CM

## 2019-02-18 DIAGNOSIS — D69.6 THROMBOCYTOPENIA (HCC): ICD-10-CM

## 2019-02-18 DIAGNOSIS — K59.00 CONSTIPATION, UNSPECIFIED CONSTIPATION TYPE: ICD-10-CM

## 2019-02-18 DIAGNOSIS — I10 ESSENTIAL HYPERTENSION: ICD-10-CM

## 2019-02-18 LAB
ALBUMIN SERPL-MCNC: 3.9 G/DL (ref 3.5–4.6)
ALP BLD-CCNC: 99 U/L (ref 40–130)
ALT SERPL-CCNC: 13 U/L (ref 0–33)
ANION GAP SERPL CALCULATED.3IONS-SCNC: 13 MEQ/L (ref 9–15)
APTT: 26.8 SEC (ref 21.6–35.4)
AST SERPL-CCNC: 23 U/L (ref 0–35)
BASOPHILS ABSOLUTE: 0 K/UL (ref 0–0.2)
BASOPHILS RELATIVE PERCENT: 0.4 %
BILIRUB SERPL-MCNC: 0.5 MG/DL (ref 0.2–0.7)
BUN BLDV-MCNC: 28 MG/DL (ref 8–23)
CALCIUM SERPL-MCNC: 9.3 MG/DL (ref 8.5–9.9)
CHLORIDE BLD-SCNC: 98 MEQ/L (ref 95–107)
CO2: 23 MEQ/L (ref 20–31)
CREAT SERPL-MCNC: 0.76 MG/DL (ref 0.5–0.9)
EKG ATRIAL RATE: 72 BPM
EKG P AXIS: 54 DEGREES
EKG P-R INTERVAL: 174 MS
EKG Q-T INTERVAL: 476 MS
EKG QRS DURATION: 174 MS
EKG QTC CALCULATION (BAZETT): 521 MS
EKG R AXIS: -72 DEGREES
EKG T AXIS: 101 DEGREES
EKG VENTRICULAR RATE: 72 BPM
EOSINOPHILS ABSOLUTE: 0 K/UL (ref 0–0.7)
EOSINOPHILS RELATIVE PERCENT: 0.7 %
GFR AFRICAN AMERICAN: >60
GFR AFRICAN AMERICAN: >60
GFR NON-AFRICAN AMERICAN: 58
GFR NON-AFRICAN AMERICAN: >60
GLOBULIN: 4.1 G/DL (ref 2.3–3.5)
GLUCOSE BLD-MCNC: 90 MG/DL (ref 70–99)
HCT VFR BLD CALC: 38.7 % (ref 37–47)
HEMOGLOBIN: 13.3 G/DL (ref 12–16)
HGB, POC: 12.9
LIPASE: 43 U/L (ref 12–95)
LYMPHOCYTES ABSOLUTE: 0.7 K/UL (ref 1–4.8)
LYMPHOCYTES RELATIVE PERCENT: 11.3 %
MCH RBC QN AUTO: 33.1 PG (ref 27–31.3)
MCHC RBC AUTO-ENTMCNC: 34.4 % (ref 33–37)
MCV RBC AUTO: 96.3 FL (ref 82–100)
MONOCYTES ABSOLUTE: 0.5 K/UL (ref 0.2–0.8)
MONOCYTES RELATIVE PERCENT: 8.3 %
NEUTROPHILS ABSOLUTE: 5 K/UL (ref 1.4–6.5)
NEUTROPHILS RELATIVE PERCENT: 79.3 %
PDW BLD-RTO: 13.1 % (ref 11.5–14.5)
PERFORMED ON: ABNORMAL
PLATELET # BLD: 109 K/UL (ref 130–400)
POC CREATININE WHOLE BLOOD: 0.9
POC CREATININE: 0.9 MG/DL (ref 0.6–1.2)
POC SAMPLE TYPE: ABNORMAL
POTASSIUM SERPL-SCNC: 4.7 MEQ/L (ref 3.4–4.9)
RBC # BLD: 4.02 M/UL (ref 4.2–5.4)
SODIUM BLD-SCNC: 134 MEQ/L (ref 135–144)
TOTAL PROTEIN: 8 G/DL (ref 6.3–8)
WBC # BLD: 6.3 K/UL (ref 4.8–10.8)

## 2019-02-18 PROCEDURE — 36415 COLL VENOUS BLD VENIPUNCTURE: CPT

## 2019-02-18 PROCEDURE — 85730 THROMBOPLASTIN TIME PARTIAL: CPT

## 2019-02-18 PROCEDURE — 93005 ELECTROCARDIOGRAM TRACING: CPT

## 2019-02-18 PROCEDURE — 6360000004 HC RX CONTRAST MEDICATION: Performed by: NURSE PRACTITIONER

## 2019-02-18 PROCEDURE — 85025 COMPLETE CBC W/AUTO DIFF WBC: CPT

## 2019-02-18 PROCEDURE — 85018 HEMOGLOBIN: CPT | Performed by: FAMILY MEDICINE

## 2019-02-18 PROCEDURE — 83690 ASSAY OF LIPASE: CPT

## 2019-02-18 PROCEDURE — 74177 CT ABD & PELVIS W/CONTRAST: CPT

## 2019-02-18 PROCEDURE — 99284 EMERGENCY DEPT VISIT MOD MDM: CPT

## 2019-02-18 PROCEDURE — 2580000003 HC RX 258: Performed by: NURSE PRACTITIONER

## 2019-02-18 PROCEDURE — 80053 COMPREHEN METABOLIC PANEL: CPT

## 2019-02-18 PROCEDURE — 99214 OFFICE O/P EST MOD 30 MIN: CPT | Performed by: FAMILY MEDICINE

## 2019-02-18 RX ORDER — 0.9 % SODIUM CHLORIDE 0.9 %
1000 INTRAVENOUS SOLUTION INTRAVENOUS ONCE
Status: COMPLETED | OUTPATIENT
Start: 2019-02-18 | End: 2019-02-18

## 2019-02-18 RX ADMIN — IOPAMIDOL 100 ML: 612 INJECTION, SOLUTION INTRAVENOUS at 20:32

## 2019-02-18 RX ADMIN — SODIUM CHLORIDE 1000 ML: 9 INJECTION, SOLUTION INTRAVENOUS at 18:58

## 2019-02-18 ASSESSMENT — ENCOUNTER SYMPTOMS
DIARRHEA: 0
WHEEZING: 0
RHINORRHEA: 0
SORE THROAT: 0
COUGH: 0
BLOOD IN STOOL: 1
TROUBLE SWALLOWING: 0
CHEST TIGHTNESS: 0
COLOR CHANGE: 0
NAUSEA: 0
VOMITING: 0
SHORTNESS OF BREATH: 0
ABDOMINAL PAIN: 0
BACK PAIN: 0
CONSTIPATION: 1
ABDOMINAL DISTENTION: 1

## 2019-02-19 PROCEDURE — 93010 ELECTROCARDIOGRAM REPORT: CPT | Performed by: INTERNAL MEDICINE

## 2019-03-15 ENCOUNTER — OFFICE VISIT (OUTPATIENT)
Dept: FAMILY MEDICINE CLINIC | Age: 84
End: 2019-03-15
Payer: MEDICARE

## 2019-03-15 VITALS
TEMPERATURE: 98.1 F | WEIGHT: 153 LBS | DIASTOLIC BLOOD PRESSURE: 72 MMHG | HEART RATE: 68 BPM | RESPIRATION RATE: 16 BRPM | SYSTOLIC BLOOD PRESSURE: 156 MMHG | BODY MASS INDEX: 25.49 KG/M2 | HEIGHT: 65 IN

## 2019-03-15 DIAGNOSIS — I10 ESSENTIAL HYPERTENSION: Primary | ICD-10-CM

## 2019-03-15 DIAGNOSIS — D69.6 THROMBOCYTOPENIA (HCC): ICD-10-CM

## 2019-03-15 DIAGNOSIS — Z95.0 PACEMAKER: ICD-10-CM

## 2019-03-15 DIAGNOSIS — R29.6 FREQUENT FALLS: ICD-10-CM

## 2019-03-15 DIAGNOSIS — R53.1 WEAKNESS: ICD-10-CM

## 2019-03-15 DIAGNOSIS — G62.9 PERIPHERAL POLYNEUROPATHY: ICD-10-CM

## 2019-03-15 PROCEDURE — G8510 SCR DEP NEG, NO PLAN REQD: HCPCS | Performed by: FAMILY MEDICINE

## 2019-03-15 PROCEDURE — 3288F FALL RISK ASSESSMENT DOCD: CPT | Performed by: FAMILY MEDICINE

## 2019-03-15 PROCEDURE — 99213 OFFICE O/P EST LOW 20 MIN: CPT | Performed by: FAMILY MEDICINE

## 2019-03-15 ASSESSMENT — PATIENT HEALTH QUESTIONNAIRE - PHQ9
SUM OF ALL RESPONSES TO PHQ9 QUESTIONS 1 & 2: 0
SUM OF ALL RESPONSES TO PHQ QUESTIONS 1-9: 0
1. LITTLE INTEREST OR PLEASURE IN DOING THINGS: 0
2. FEELING DOWN, DEPRESSED OR HOPELESS: 0
SUM OF ALL RESPONSES TO PHQ QUESTIONS 1-9: 0

## 2019-04-15 ENCOUNTER — TELEPHONE (OUTPATIENT)
Dept: INFECTIOUS DISEASES | Age: 84
End: 2019-04-15

## 2019-04-15 RX ORDER — SPIRONOLACTONE 25 MG/1
TABLET ORAL
Qty: 90 TABLET | Refills: 1 | Status: CANCELLED | OUTPATIENT
Start: 2019-04-15

## 2019-04-17 RX ORDER — DOXYCYCLINE HYCLATE 100 MG
100 TABLET ORAL 2 TIMES DAILY
Qty: 60 TABLET | Refills: 1 | Status: SHIPPED | OUTPATIENT
Start: 2019-04-17 | End: 2019-05-17

## 2019-04-17 NOTE — TELEPHONE ENCOUNTER
Per Consult with Dr Tiara Reinoso in clinic, 4-16-19, He states Ok to Refill the Doxy, w/ One Refill and F/u in clinic. Call to Patient this morning and advised of Refill of Doxy, will be sent to [facility]. But the Spironolactone Will Not by Dr Tiara Reinoso. A F/u appt is needed. She understood and agreed.    Patient appt is for 5-21-19 @ 9:30 am.

## 2019-05-21 ENCOUNTER — OFFICE VISIT (OUTPATIENT)
Dept: INFECTIOUS DISEASES | Age: 84
End: 2019-05-21
Payer: MEDICARE

## 2019-05-21 VITALS
DIASTOLIC BLOOD PRESSURE: 60 MMHG | RESPIRATION RATE: 16 BRPM | WEIGHT: 153 LBS | SYSTOLIC BLOOD PRESSURE: 118 MMHG | HEIGHT: 65 IN | BODY MASS INDEX: 25.49 KG/M2 | TEMPERATURE: 97.7 F | HEART RATE: 61 BPM

## 2019-05-21 DIAGNOSIS — T84.59XD INFECTED PROSTHETIC KNEE JOINT, SUBSEQUENT ENCOUNTER: Primary | ICD-10-CM

## 2019-05-21 DIAGNOSIS — Z96.659 INFECTED PROSTHETIC KNEE JOINT, SUBSEQUENT ENCOUNTER: Primary | ICD-10-CM

## 2019-05-21 PROCEDURE — 99212 OFFICE O/P EST SF 10 MIN: CPT | Performed by: INTERNAL MEDICINE

## 2019-05-21 RX ORDER — DOXYCYCLINE HYCLATE 100 MG
100 TABLET ORAL 2 TIMES DAILY
Qty: 180 TABLET | Refills: 3 | Status: SHIPPED | OUTPATIENT
Start: 2019-05-21 | End: 2019-08-19

## 2019-05-21 RX ORDER — DOXYCYCLINE HYCLATE 100 MG
100 TABLET ORAL 2 TIMES DAILY
COMMUNITY
End: 2019-05-24 | Stop reason: SDUPTHER

## 2019-05-21 ASSESSMENT — ENCOUNTER SYMPTOMS: RESPIRATORY NEGATIVE: 1

## 2019-05-21 NOTE — PROGRESS NOTES
Subjective:      Patient ID: Jamee Bryant is a 80 y.o. female. HPI    Pt is a 80-yr old female who is here for a follow up appointment has right prosthetic knee infection, on PO antibiotics for suppression.       Currently is on PO Doxy       1/13/2017  4:18 PM - Ric, Chpo Incoming Lab Results From Soft   Component Results   Component Value Ref Range & Units Status   Sed Rate 51 (H) 0 - 30 mm          All  Bactrim      Review of Systems   Constitutional: Negative. Respiratory: Negative. Cardiovascular: Negative. Genitourinary: Negative. Neurological: Negative. Objective:   Physical Exam   Constitutional: She appears well-developed. HENT:   Head: Normocephalic. Neck: Normal range of motion. No JVD present. No tracheal deviation present. No thyromegaly present. Cardiovascular: Normal rate. Exam reveals no gallop. No murmur heard. Pulmonary/Chest: No respiratory distress. She has no wheezes. She has no rales. She exhibits no tenderness. Abdominal: Soft. She exhibits no distension. There is no tenderness. There is no rebound and no guarding. Musculoskeletal:        Legs:  Lymphadenopathy:     She has no cervical adenopathy. Assessment:        Diagnosis Orders   1.  Infected prosthetic knee joint, subsequent encounter             Plan:      Osteomyelitis l knee    Po doxy for life

## 2019-05-24 ENCOUNTER — TELEPHONE (OUTPATIENT)
Dept: ADMINISTRATIVE | Age: 84
End: 2019-05-24

## 2019-05-24 RX ORDER — DOXYCYCLINE HYCLATE 100 MG
100 TABLET ORAL 2 TIMES DAILY
Qty: 60 TABLET | Refills: 1 | Status: SHIPPED | OUTPATIENT
Start: 2019-05-24 | End: 2019-06-23

## 2019-10-31 NOTE — ANESTHESIA POSTPROCEDURE EVALUATION
Department of Anesthesiology  Postprocedure Note    Patient: Burak Harris  MRN: [id number]  YOB: 1924  Date of evaluation: 10/23/2017  Time:  5:45 PM     Procedure Summary     Date:  10/23/17 Room / Location:  [address]    Anesthesia Start:  2708 Anesthesia Stop:  1745    Procedure:  TFN NAIL C-ARM RIGHT (Right ) Diagnosis:  (RIGHT HIP FRACTURE)    Surgeon:  Cameron Farrar MD Responsible Provider:  Mc Daniels MD    Anesthesia Type:  general ASA Status:  3          Anesthesia Type: general    Brian Phase I:      Brian Phase II:      Last vitals: Reviewed and per EMR flowsheets.        Anesthesia Post Evaluation    Patient location during evaluation: bedside  Patient participation: complete - patient participated  Level of consciousness: awake and awake and alert  Pain score: 3  Airway patency: patent  Nausea & Vomiting: no nausea and no vomiting  Complications: no  Cardiovascular status: blood pressure returned to baseline and hemodynamically stable  Respiratory status: acceptable  Hydration status: euvolemic 31-Oct-2019 09:05

## 2019-11-19 ENCOUNTER — TELEPHONE (OUTPATIENT)
Dept: FAMILY MEDICINE CLINIC | Age: 84
End: 2019-11-19

## 2020-01-28 ENCOUNTER — TELEPHONE (OUTPATIENT)
Dept: INFECTIOUS DISEASES | Age: 85
End: 2020-01-28

## 2020-01-28 NOTE — TELEPHONE ENCOUNTER
Call received from pt requesting med refill for Doxycycline. Per Dr. Tata Landon, ok to reorder. [facility] to call in [facility]ill. Pt has not refilled Doxy since Aug. 2019. Call placed to pt and daughter [address] answered phone. [address] is listed on [facility]. Per [address] [place] [address], she is requesting medication refill to be called to [facility]. Call placed to [facility]. Doxy 100mg BID #60 RF 1 v.o. given. Pharmacist verified understanding.

## 2020-06-04 ENCOUNTER — TELEPHONE (OUTPATIENT)
Dept: INFECTIOUS DISEASES | Age: 85
End: 2020-06-04

## 2021-06-13 ENCOUNTER — APPOINTMENT (OUTPATIENT)
Dept: CT IMAGING | Age: 86
End: 2021-06-13
Payer: MEDICARE

## 2021-06-13 ENCOUNTER — HOSPITAL ENCOUNTER (EMERGENCY)
Age: 86
Discharge: HOME | End: 2021-06-13
Attending: EMERGENCY MEDICINE
Payer: MEDICARE

## 2021-06-13 VITALS
DIASTOLIC BLOOD PRESSURE: 58 MMHG | HEIGHT: 61 IN | OXYGEN SATURATION: 98 % | RESPIRATION RATE: 17 BRPM | BODY MASS INDEX: 26.43 KG/M2 | SYSTOLIC BLOOD PRESSURE: 116 MMHG | TEMPERATURE: 97 F | WEIGHT: 140 LBS | HEART RATE: 73 BPM

## 2021-06-13 DIAGNOSIS — S01.01XA LACERATION OF SCALP, INITIAL ENCOUNTER: ICD-10-CM
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
## 2021-06-13 DIAGNOSIS — S09.90XA CLOSED HEAD INJURY, INITIAL ENCOUNTER: ICD-10-CM

## 2021-06-13 LAB
ALBUMIN SERPL-MCNC: 3.9 G/DL (ref 3.5–4.6)
ALP BLD-CCNC: 97 U/L (ref 40–130)
ALT SERPL-CCNC: 11 U/L (ref 0–33)
ANION GAP SERPL CALCULATED.3IONS-SCNC: 11 MEQ/L (ref 9–15)
AST SERPL-CCNC: 21 U/L (ref 0–35)
BASOPHILS ABSOLUTE: 0 K/UL (ref 0–0.2)
BASOPHILS RELATIVE PERCENT: 0.4 %
BILIRUB SERPL-MCNC: 0.3 MG/DL (ref 0.2–0.7)
BUN BLDV-MCNC: 32 MG/DL (ref 8–23)
CALCIUM SERPL-MCNC: 9.8 MG/DL (ref 8.5–9.9)
CHLORIDE BLD-SCNC: 103 MEQ/L (ref 95–107)
CO2: 24 MEQ/L (ref 20–31)
CREAT SERPL-MCNC: 0.81 MG/DL (ref 0.5–0.9)
EOSINOPHILS ABSOLUTE: 0.1 K/UL (ref 0–0.7)
EOSINOPHILS RELATIVE PERCENT: 0.8 %
GFR AFRICAN AMERICAN: >60
GFR NON-AFRICAN AMERICAN: >60
GLOBULIN: 4.5 G/DL (ref 2.3–3.5)
GLUCOSE BLD-MCNC: 125 MG/DL (ref 70–99)
HCT VFR BLD CALC: 35.4 % (ref 37–47)
HEMOGLOBIN: 11.9 G/DL (ref 12–16)
LYMPHOCYTES ABSOLUTE: 2.3 K/UL (ref 1–4.8)
LYMPHOCYTES RELATIVE PERCENT: 36.4 %
MCH RBC QN AUTO: 33.8 PG (ref 27–31.3)
MCHC RBC AUTO-ENTMCNC: 33.6 % (ref 33–37)
MCV RBC AUTO: 100.6 FL (ref 82–100)
MONOCYTES ABSOLUTE: 0.5 K/UL (ref 0.2–0.8)
MONOCYTES RELATIVE PERCENT: 8.4 %
NEUTROPHILS ABSOLUTE: 3.4 K/UL (ref 1.4–6.5)
NEUTROPHILS RELATIVE PERCENT: 54 %
PDW BLD-RTO: 14.3 % (ref 11.5–14.5)
PLATELET # BLD: 137 K/UL (ref 130–400)
POTASSIUM SERPL-SCNC: 4.4 MEQ/L (ref 3.4–4.9)
RBC # BLD: 3.52 M/UL (ref 4.2–5.4)
SODIUM BLD-SCNC: 138 MEQ/L (ref 135–144)
TOTAL PROTEIN: 8.4 G/DL (ref 6.3–8)
WBC # BLD: 6.3 K/UL (ref 4.8–10.8)

## 2021-06-13 PROCEDURE — 2500000003 HC RX 250 WO HCPCS: Performed by: EMERGENCY MEDICINE

## 2021-06-13 PROCEDURE — 6360000002 HC RX W HCPCS: Performed by: EMERGENCY MEDICINE

## 2021-06-13 PROCEDURE — 96374 THER/PROPH/DIAG INJ IV PUSH: CPT

## 2021-06-13 PROCEDURE — 6830039000 HC L3 TRAUMA ALERT

## 2021-06-13 PROCEDURE — 12004 RPR S/N/AX/GEN/TRK7.6-12.5CM: CPT

## 2021-06-13 PROCEDURE — 36415 COLL VENOUS BLD VENIPUNCTURE: CPT

## 2021-06-13 PROCEDURE — 85025 COMPLETE CBC W/AUTO DIFF WBC: CPT

## 2021-06-13 PROCEDURE — 70450 CT HEAD/BRAIN W/O DYE: CPT

## 2021-06-13 PROCEDURE — 80053 COMPREHEN METABOLIC PANEL: CPT

## 2021-06-13 PROCEDURE — 72125 CT NECK SPINE W/O DYE: CPT

## 2021-06-13 PROCEDURE — 2580000003 HC RX 258: Performed by: EMERGENCY MEDICINE

## 2021-06-13 PROCEDURE — 99285 EMERGENCY DEPT VISIT HI MDM: CPT

## 2021-06-13 RX ORDER — CEPHALEXIN 500 MG/1
500 CAPSULE ORAL 2 TIMES DAILY
Qty: 14 CAPSULE | Refills: 0 | Status: SHIPPED | OUTPATIENT
Start: 2021-06-13 | End: 2021-06-20

## 2021-06-13 RX ORDER — 0.9 % SODIUM CHLORIDE 0.9 %
1000 INTRAVENOUS SOLUTION INTRAVENOUS ONCE
Status: COMPLETED | OUTPATIENT
Start: 2021-06-13 | End: 2021-06-13

## 2021-06-13 RX ORDER — LIDOCAINE HYDROCHLORIDE AND EPINEPHRINE 10; 10 MG/ML; UG/ML
20 INJECTION, SOLUTION INFILTRATION; PERINEURAL ONCE
Status: COMPLETED | OUTPATIENT
Start: 2021-06-13 | End: 2021-06-13

## 2021-06-13 RX ORDER — BACITRACIN, NEOMYCIN, POLYMYXIN B 400; 3.5; 5 [USP'U]/G; MG/G; [USP'U]/G
OINTMENT TOPICAL ONCE
Status: DISCONTINUED | OUTPATIENT
Start: 2021-06-13 | End: 2021-06-14 | Stop reason: HOSPADM

## 2021-06-13 RX ORDER — FENTANYL CITRATE 50 UG/ML
50 INJECTION, SOLUTION INTRAMUSCULAR; INTRAVENOUS ONCE
Status: COMPLETED | OUTPATIENT
Start: 2021-06-13 | End: 2021-06-13

## 2021-06-13 RX ORDER — HYDROCODONE BITARTRATE AND ACETAMINOPHEN 5; 325 MG/1; MG/1
1 TABLET ORAL EVERY 6 HOURS PRN
Qty: 10 TABLET | Refills: 0 | Status: SHIPPED | OUTPATIENT
Start: 2021-06-13 | End: 2021-06-16

## 2021-06-13 RX ADMIN — LIDOCAINE HYDROCHLORIDE AND EPINEPHRINE 20 ML: 10; 10 INJECTION, SOLUTION INFILTRATION; PERINEURAL at 17:46

## 2021-06-13 RX ADMIN — SODIUM CHLORIDE 1000 ML: 9 INJECTION, SOLUTION INTRAVENOUS at 17:46

## 2021-06-13 RX ADMIN — FENTANYL CITRATE 50 MCG: 50 INJECTION, SOLUTION INTRAMUSCULAR; INTRAVENOUS at 17:45

## 2021-06-13 ASSESSMENT — PAIN SCALES - GENERAL
PAINLEVEL_OUTOF10: 7
PAINLEVEL_OUTOF10: 5
PAINLEVEL_OUTOF10: 5

## 2021-06-13 ASSESSMENT — ENCOUNTER SYMPTOMS
DIARRHEA: 0
SHORTNESS OF BREATH: 0
BACK PAIN: 0
SORE THROAT: 0
COUGH: 0
NAUSEA: 0
ABDOMINAL PAIN: 0
VOMITING: 0

## 2021-06-13 ASSESSMENT — PAIN DESCRIPTION - LOCATION
LOCATION: HEAD
LOCATION: HEAD

## 2021-06-13 ASSESSMENT — PAIN DESCRIPTION - PAIN TYPE
TYPE: ACUTE PAIN
TYPE: ACUTE PAIN

## 2021-06-13 ASSESSMENT — PAIN DESCRIPTION - FREQUENCY: FREQUENCY: CONTINUOUS

## 2021-06-13 NOTE — ED PROVIDER NOTES
[address] [facility] ED  eMERGENCYdEPARTMENT eNCOUnter      Pt Name: Lucero Coats  MRN: [id number]  Ashlee 7/28/1924  Date of evaluation: 6/13/2021  Naresh Dos Santos MD    CHIEF COMPLAINT           HPI  Lucero Coats is a 80 y.o. 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 Pt notes just prior to arrival she tripped over her garbage can and then hit her head. -LOC. Pt notes moderate, constant, throbbing, headache. Pt denies fever, n/v, neck pain, cp, sob, dysuria, diarrhea. ROS  Review of Systems   Constitutional: Negative for activity change, chills and fever. HENT: Negative for ear pain and sore throat. Eyes: Negative for visual disturbance. Respiratory: Negative for cough and shortness of breath. Cardiovascular: Negative for chest pain, palpitations and leg swelling. Gastrointestinal: Negative for abdominal pain, diarrhea, nausea and vomiting. Genitourinary: Negative for dysuria. Musculoskeletal: Negative for back pain. Skin: Negative for rash. Neurological: Positive for headaches. Negative for dizziness and weakness. Except as noted above the remainder of the review of systems was reviewed and negative.        PAST MEDICAL HISTORY     Past Medical History:   Diagnosis Date    Back pain     Constipation     Diverticulosis     DVT (deep venous thrombosis) (HCC)     H/O echocardiogram     EF normal    Hearing loss     Heart block AV third degree (HCC)     HTN (hypertension)     Osteoporosis     Pacemaker     Peripheral neuropathy     Pulmonary hypertension ([address]     Pulmonary nodule, left     Thrombocytopenia (HCC)     Tricuspid regurgitation     Tubular adenoma 2010         SURGICAL HISTORY       Past Surgical History:   Procedure Laterality Date    COLONOSCOPY  1990s    declines further-hx adenoma    HIP FRACTURE SURGERY Right 10/23/2017    TFN NAIL C-ARM RIGHT performed by Umu Gray MD at [address] [facility] REPLACEMENT  12/2017    pin in right hip from previous surgery did not hold, hip replaced    SHOULDER ARTHROPLASTY  1990s    right     TOTAL KNEE ARTHROPLASTY  2008    right         CURRENTMEDICATIONS       Previous Medications    BENZONATATE (TESSALON) 100 MG CAPSULE    Take 100 mg by mouth 3 times daily as needed for Cough    BISACODYL (DULCOLAX) 10 MG SUPPOSITORY    Place 10 mg rectally daily as needed for Constipation    CAPSAICIN 0.1 % CREA    Apply 1 Units topically 2 times daily    DOCUSATE SODIUM (COLACE) 100 MG CAPSULE    Take 100 mg by mouth every morning    FLUOROURACIL (EFUDEX) 5 % SOLN EXTERNAL SOLUTION    Apply topically 2 times daily    HYDROCORTISONE (ANUSOL-HC) 2.5 % RECTAL CREAM    Place rectally 2 times daily. LACTOBACILLUS (ACIDOPHILUS PO)    Take by mouth 3 times daily    MAGNESIUM HYDROXIDE (MILK OF MAGNESIA PO)    Take 30 mLs by mouth daily as needed (hard stool)    MELATONIN 5 MG TABS TABLET    Take 5 mg by mouth nightly    POLYETHYLENE GLYCOL (GLYCOLAX) POWDER    Take 17 g by mouth daily (mix in 8oz of water)    SODIUM PHOSPHATES (FLEET ENEMA RE)    Place 1 Units rectally daily as needed (constipation)    SPIRONOLACTONE (ALDACTONE) 25 MG TABLET    TAKE 1 TABLET DAILY       ALLERGIES     Bactrim [sulfamethoxazole-trimethoprim]    FAMILY HISTORY       Family History   Problem Relation Age of Onset    Cancer Mother     Other Father         heart attack.           SOCIAL HISTORY       Social History     Socioeconomic History    Marital status:      Spouse name: None    Number of children: None    Years of education: None    Highest education level: None   Occupational History    None   Tobacco Use    Smoking status: Former Smoker     Types: Cigarettes    Smokeless tobacco: Never Used   Substance and Sexual Activity    Alcohol use: No     Alcohol/week: 0.0 standard drinks    Drug use: No    Sexual activity: Never   Other Topics Concern    None   Social History Narrative    Lives alone     Social Determinants of Health     Financial Resource Strain:     Difficulty of Paying Living Expenses:    Food Insecurity:     Worried About Running Out of Food in the Last Year:     [address] in the Last Year:    Transportation Needs:     Lack of Transportation (Medical):  Lack of Transportation (Non-Medical):    Physical Activity:     Days of Exercise per Week:     Minutes of Exercise per Session:    Stress:     Feeling of Stress :    Social Connections:     Frequency of Communication with Friends and Family:     Frequency of Social Gatherings with Friends and Family:     Attends [employer] Services:     Active Member of Clubs or Organizations:     Attends Club or Organization Meetings:     Marital Status:    Intimate Partner Violence:     Fear of Current or Ex-Partner:     Emotionally Abused:     Physically Abused:     Sexually Abused:          PHYSICAL EXAM       ED Triage Vitals   BP Temp Temp src Pulse Resp SpO2 Height Weight   -- -- -- -- -- -- -- --       Physical Exam  Vitals and nursing note reviewed. Constitutional:       Appearance: She is well-developed. HENT:      Head: Normocephalic. Comments: 10 cm deep laceration noted to L temporal/parietal area with active bleeding. Right Ear: External ear normal.      Left Ear: External ear normal.   Eyes:      Conjunctiva/sclera: Conjunctivae normal.      Pupils: Pupils are equal, round, and reactive to light. Neck:      Comments: No cspine tenderness  Cardiovascular:      Rate and Rhythm: Normal rate and regular rhythm. Heart sounds: Normal heart sounds. Pulmonary:      Effort: Pulmonary effort is normal.      Breath sounds: Normal breath sounds. Abdominal:      General: Bowel sounds are normal. There is no distension. Palpations: Abdomen is soft.       Tenderness: Physician            Windy Sloan MD  06/13/21 2880

## 2021-06-13 NOTE — ED NOTES
Bed: 15  Expected date:   Expected time:   Means of arrival:   Comments:  79 yo female FALL   Lacerations to head - EMS having difficult time controlling bleeding   - LOC, - blood thinners  134/86, 80 NSR, 98% RA, 18 RPM  CAT II paged at [facility], [facility]  06/13/21 7025

## 2021-06-13 NOTE — ED NOTES
Pt received 11 stitches at this time bleeding is controled pt tolerated well.      Samuel Armstrong RN  06/13/21 7223

## 2021-06-13 NOTE — ED NOTES
Call placed to [facility], [facility]  06/13/21 1932

## 2021-06-14 NOTE — ED NOTES
Pt and daughter updated that [facility] was called and they should be here within the hour per them.       Maday Haney RN  06/13/21 2054

## 2021-06-14 NOTE — ED NOTES
Pt has 2 small areas from bruising on r elbow.    Updated Dr. Ashleigh Oconnell and stated to cleanse and apply small dressing to elbow     Steffen Camacho RN  06/13/21 2007       Steffen Camacho RN  06/13/21 2007

## 2021-06-14 NOTE — ED NOTES
Dressing applied to the right elbow nonadherent secured with kerlix and tape. Pt denies any pain.       Tobi Casas RN  06/13/21 2045

## 2021-10-16 ENCOUNTER — HOSPITAL ENCOUNTER (EMERGENCY)
Age: 86
Discharge: HOME | End: 2021-10-16
Attending: EMERGENCY MEDICINE
Payer: MEDICARE

## 2021-10-16 ENCOUNTER — APPOINTMENT (OUTPATIENT)
Dept: CT IMAGING | Age: 86
End: 2021-10-16
Payer: MEDICARE

## 2021-10-16 VITALS
SYSTOLIC BLOOD PRESSURE: 128 MMHG | BODY MASS INDEX: 23.32 KG/M2 | HEART RATE: 74 BPM | RESPIRATION RATE: 17 BRPM | OXYGEN SATURATION: 98 % | TEMPERATURE: 98.2 F | HEIGHT: 65 IN | DIASTOLIC BLOOD PRESSURE: 74 MMHG | WEIGHT: 140 LBS
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
## 2021-10-16 DIAGNOSIS — S09.90XA INJURY OF HEAD, INITIAL ENCOUNTER: ICD-10-CM

## 2021-10-16 PROCEDURE — 72125 CT NECK SPINE W/O DYE: CPT

## 2021-10-16 PROCEDURE — 70450 CT HEAD/BRAIN W/O DYE: CPT

## 2021-10-16 PROCEDURE — 99285 EMERGENCY DEPT VISIT HI MDM: CPT

## 2021-10-16 PROCEDURE — 6830039000 HC L3 TRAUMA ALERT

## 2021-10-16 RX ORDER — ACETAMINOPHEN 325 MG/1
650 TABLET ORAL ONCE
Status: DISCONTINUED | OUTPATIENT
Start: 2021-10-16 | End: 2021-10-17 | Stop reason: HOSPADM

## 2021-10-16 ASSESSMENT — PAIN SCALES - GENERAL
PAINLEVEL_OUTOF10: 5
PAINLEVEL_OUTOF10: 5

## 2021-10-16 ASSESSMENT — PAIN DESCRIPTION - PAIN TYPE: TYPE: ACUTE PAIN

## 2021-10-16 ASSESSMENT — PAIN DESCRIPTION - FREQUENCY: FREQUENCY: CONTINUOUS

## 2021-10-16 ASSESSMENT — PAIN DESCRIPTION - LOCATION: LOCATION: HEAD

## 2021-10-16 ASSESSMENT — PAIN DESCRIPTION - DESCRIPTORS: DESCRIPTORS: ACHING

## 2021-10-17 NOTE — ED PROVIDER NOTES
[address] [facility]  EMERGENCY DEPARTMENT ENCOUNTER      Pt Name: Walt Pedroza  MRN: [id number]  [place] 7/28/1924  Date of evaluation: 10/16/2021  Provider: Mayuri Barrios MD    CHIEF COMPLAINT       Chief Complaint   Patient presents with    Fall         HISTORY OF PRESENT ILLNESS   (Location/Symptom, Timing/Onset, Context/Setting, Quality, Duration, Modifying Factors, Severity)  Note limiting factors. 77-year-old female presenting after mechanical fall. Patient was reaching for something in the cupboard and lost her balance. She fell backwards and hit her head. There is no loss of consciousness. No vomiting. She does have a bump on the back of her head. Ambulatory afterwards. She denies any other pain or symptoms. Has not taken anything for relief. She is on an aspirin daily, notes no other blood thinners. Nursing Notes were reviewed. REVIEW OF SYSTEMS    (2-9 systems for level 4, 10 or more for level 5)     Review of Systems   Neurological: Positive for headaches. All other systems reviewed and are negative. Except as noted above the remainder of the review of systems was reviewed and negative.        PAST MEDICAL HISTORY     Past Medical History:   Diagnosis Date    Back pain     Constipation     Diverticulosis     DVT (deep venous thrombosis) (HCC)     H/O echocardiogram     EF normal    Hearing loss     Heart block AV third degree (HCC)     HTN (hypertension)     Osteoporosis     Pacemaker     Peripheral neuropathy     Pulmonary hypertension (HCC)     Pulmonary nodule, left     Thrombocytopenia (HCC)     Tricuspid regurgitation     Tubular adenoma 2010         SURGICAL HISTORY       Past Surgical History:   Procedure Laterality Date    COLONOSCOPY  1990s    declines further-hx adenoma    HIP FRACTURE SURGERY Right 10/23/2017    TFN NAIL C-ARM RIGHT performed by Keagan Redd MD at [address] [facility] REPLAC[facility]  12/2017 pin in right hip from previous surgery did not hold, hip replaced    SHOULDER ARTHROPLASTY  1990s    right     TOTAL KNEE ARTHROPLASTY  2008    right         CURRENT MEDICATIONS       Previous Medications    BENZONATATE (TESSALON) 100 MG CAPSULE    Take 100 mg by mouth 3 times daily as needed for Cough    BISACODYL (DULCOLAX) 10 MG SUPPOSITORY    Place 10 mg rectally daily as needed for Constipation    CAPSAICIN 0.1 % CREA    Apply 1 Units topically 2 times daily    DOCUSATE SODIUM (COLACE) 100 MG CAPSULE    Take 100 mg by mouth every morning    FLUOROURACIL (EFUDEX) 5 % SOLN EXTERNAL SOLUTION    Apply topically 2 times daily    HYDROCORTISONE (ANUSOL-HC) 2.5 % RECTAL CREAM    Place rectally 2 times daily. LACTOBACILLUS (ACIDOPHILUS PO)    Take by mouth 3 times daily    MAGNESIUM HYDROXIDE (MILK OF MAGNESIA PO)    Take 30 mLs by mouth daily as needed (hard stool)    MELATONIN 5 MG TABS TABLET    Take 5 mg by mouth nightly    POLYETHYLENE GLYCOL (GLYCOLAX) POWDER    Take 17 g by mouth daily (mix in 8oz of water)    SODIUM PHOSPHATES (FLEET ENEMA RE)    Place 1 Units rectally daily as needed (constipation)    SPIRONOLACTONE (ALDACTONE) 25 MG TABLET    TAKE 1 TABLET DAILY       ALLERGIES     Bactrim [sulfamethoxazole-trimethoprim]    FAMILY HISTORY       Family History   Problem Relation Age of Onset    Cancer Mother     Other Father         heart attack.           SOCIAL HISTORY       Social History     Socioeconomic History    Marital status:      Spouse name: None    Number of children: None    Years of education: None    Highest education level: None   Occupational History    None   Tobacco Use    Smoking status: Former Smoker     Types: Cigarettes    Smokeless tobacco: Never Used   Substance and Sexual Activity    Alcohol use: No     Alcohol/week: 0.0 standard drinks    Drug use: No    Sexual activity: Never   Other Topics Concern    None   Social History Narrative    Lives alone     Social Determinants of Health     Financial Resource Strain:     Difficulty of Paying Living Expenses:    Food Insecurity:     Worried About Running Out of Food in the Last Year:     [address] in the Last Year:    Transportation Needs:     Lack of Transportation (Medical):  Lack of Transportation (Non-Medical):    Physical Activity:     Days of Exercise per Week:     Minutes of Exercise per Session:    Stress:     Feeling of Stress :    Social Connections:     Frequency of Communication with Friends and Family:     Frequency of Social Gatherings with Friends and Family:     Attends Buddhist Services:     Active Member of Clubs or Organizations:     Attends Club or Organization Meetings:     Marital Status:    Intimate Partner Violence:     Fear of Current or Ex-Partner:     Emotionally Abused:     Physically Abused:     Sexually Abused:        SCREENINGS    V[place]ant Coma Scale  Eye Opening: Spontaneous  Best Verbal Response: Oriented  Best Motor Response: Obeys commands  Napoleon Coma Scale Score: 15          PHYSICAL EXAM    (up to 7 for level 4, 8 or more for level 5)     ED Triage Vitals   BP Temp Temp Source Pulse Resp SpO2 Height Weight   10/16/21 2049 10/16/21 2032 10/16/21 2032 10/16/21 2032 10/16/21 2032 10/16/21 2032 10/16/21 2032 10/16/21 2032   134/77 98.2 °F (36.8 °C) Oral 77 18 98 % 5' 5\" (1.651 m) 140 lb (63.5 kg)       Physical Exam  Vitals and nursing note reviewed. Constitutional:       General: She is not in acute distress. Appearance: Normal appearance. She is well-developed. She is not ill-appearing. HENT:      Head: Normocephalic. Comments: Hematoma of posterior occiput     Right Ear: Tympanic membrane normal.      Left Ear: Tympanic membrane normal.      Mouth/Throat:      Mouth: Mucous membranes are moist.      Pharynx: Oropharynx is clear. Eyes:      Extraocular Movements: Extraocular movements intact.       Conjunctiva/sclera: Conjunctivae normal. Cardiovascular:      Rate and Rhythm: Normal rate and regular rhythm. Pulmonary:      Effort: Pulmonary effort is normal.      Breath sounds: Normal breath sounds. Abdominal:      General: Bowel sounds are normal.      Palpations: Abdomen is soft. Tenderness: There is no abdominal tenderness. Musculoskeletal:         General: No deformity. Normal range of motion. Cervical back: Normal range of motion and neck supple. Skin:     General: Skin is warm and dry. Capillary Refill: Capillary refill takes less than 2 seconds. Neurological:      General: No focal deficit present. Mental Status: She is alert and oriented to person, place, and time. Mental status is at baseline. Cranial Nerves: No cranial nerve deficit. Psychiatric:         Thought Content: Thought content normal.         DIAGNOSTIC RESULTS     EKG: All EKG's are interpreted by the Emergency Department Physician who either signs or Co-signs this chart in the absence of a cardiologist.    RADIOLOGY:   Non-plain film images such as CT, Ultrasound and MRI are read by the radiologist. Plain radiographic images are visualized and preliminarily interpreted by the emergency physician with the below findings:    Interpretation per the Radiologist below, if available at the time of this note:    CT Head WO Contrast   Final Result     No acute post-traumatic intracranial abnormality. No acute stroke or hemorrhage. Non-specific white matter changes, most commonly seen with small vessel    disease. CT CERVICAL SPINE WO CONTRAST   Final Result     No acute fracture or subluxation. Chronic type II dens fracture with unchanged posterior displacement. Unchanged degenerative changes grades at the C5-6 and C6-7 disc spaces. Unchanged grade 1 anterior spondylolisthesis of C7 on T1.               LABS:  Labs Reviewed - No data to display    All other labs were within normal range or not returned as of this dictation. EMERGENCY DEPARTMENT COURSE and DIFFERENTIAL DIAGNOSIS/MDM:   Vitals:    Vitals:    10/16/21 2032 10/16/21 2049   BP:  134/77   Pulse: 77    Resp: 18    Temp: 98.2 °F (36.8 °C)    TempSrc: Oral    SpO2: 98%    Weight: 140 lb (63.5 kg)    Height: 5' 5\" (1.651 m)        MDM  Number of Diagnoses or Management Options  Fall, initial encounter  Injury of head, initial encounter  Diagnosis management comments: Presents after mechanical fall. Imaging negative. Patient will be discharged home in good condition. Patient has been hemodynamically stable throughout ED course and is appropriate for outpatient follow up. Patient should follow up with PCP in 2-3 days or return to ED immediately for any new or worsening symptoms. Patient is well appearing on discharge and agreeable with plan of care. Procedures    CRITICAL CARE TIME   Total Critical Care time was 0 minutes, excluding separately reportable procedures. There was a high probability of clinically significant/life threatening deterioration in the patient's condition which required my urgent intervention. FINAL IMPRESSION      1. Fall, initial encounter    2.  Injury of head, initial encounter          DISPOSITION/PLAN   DISPOSITION        (Please note that portions of this note were completed with a voice recognition program.  Efforts were made to edit the dictations but occasionally words are mis-transcribed.)    Angie Hinson MD (electronically signed)  Attending Emergency Physician        Angie Hinson MD  10/16/21 2118       Angie Hinson MD  10/16/21 2118

## 2021-10-17 NOTE — ED TRIAGE NOTES
Pt comes from [facility].   Per NH pt was leaving forward today to reach for her water and fell forward hitting her head  Pt is A&O x 3 which NH states is her norm   Pt denies LOC   Pt is on no blood thinners   NH states they didn't plan on sending the pt but she wanted to come to get her head checked out

## 2021-12-09 NOTE — ED NOTES
15 min warning called     Patsy Velasquez RN  10/22/17 1704
Bacitracin and dressing applied to pts right middle finger as ordered.       Arnaud Ulloa RN  10/22/17 7019
Bed: 12  Expected date: 10/22/17  Expected time: 1:50 AM  Means of arrival: [facility]  Comments:  79yo F, fall. Possible hip fx.      Rashaad Garcia RN  10/22/17 9029
Report called to [facility] on [address], [facility]  10/22/17 4986
normal...

## 2022-02-18 ENCOUNTER — HOSPITAL ENCOUNTER (EMERGENCY)
Age: 87
Discharge: HOME | End: 2022-02-18
Attending: EMERGENCY MEDICINE
Payer: MEDICARE

## 2022-02-18 ENCOUNTER — APPOINTMENT (OUTPATIENT)
Dept: GENERAL RADIOLOGY | Age: 87
End: 2022-02-18
Payer: MEDICARE

## 2022-02-18 VITALS
RESPIRATION RATE: 16 BRPM | DIASTOLIC BLOOD PRESSURE: 54 MMHG | HEART RATE: 67 BPM | WEIGHT: 140 LBS | SYSTOLIC BLOOD PRESSURE: 127 MMHG | BODY MASS INDEX: 23.32 KG/M2 | TEMPERATURE: 97.1 F | HEIGHT: 65 IN | OXYGEN SATURATION: 98 %

## 2022-02-18 DIAGNOSIS — N30.00 ACUTE CYSTITIS WITHOUT HEMATURIA: Primary | ICD-10-CM

## 2022-02-18 LAB
ALBUMIN SERPL-MCNC: 3.6 G/DL (ref 3.5–4.6)
ALP BLD-CCNC: 103 U/L (ref 40–130)
ALT SERPL-CCNC: 17 U/L (ref 0–33)
ANION GAP SERPL CALCULATED.3IONS-SCNC: 10 MEQ/L (ref 9–15)
AST SERPL-CCNC: 23 U/L (ref 0–35)
BACTERIA: ABNORMAL /HPF
BASOPHILS ABSOLUTE: 0 K/UL (ref 0–0.2)
BASOPHILS RELATIVE PERCENT: 0.2 %
BILIRUB SERPL-MCNC: 0.4 MG/DL (ref 0.2–0.7)
BILIRUBIN URINE: NEGATIVE
BLOOD, URINE: ABNORMAL
BUN BLDV-MCNC: 28 MG/DL (ref 8–23)
CALCIUM SERPL-MCNC: 8.6 MG/DL (ref 8.5–9.9)
CHLORIDE BLD-SCNC: 103 MEQ/L (ref 95–107)
CLARITY: ABNORMAL
CO2: 25 MEQ/L (ref 20–31)
COLOR: YELLOW
CREAT SERPL-MCNC: 0.62 MG/DL (ref 0.5–0.9)
EOSINOPHILS ABSOLUTE: 0 K/UL (ref 0–0.7)
EOSINOPHILS RELATIVE PERCENT: 0.9 %
EPITHELIAL CELLS, UA: ABNORMAL /HPF (ref 0–5)
GFR AFRICAN AMERICAN: >60
GFR NON-AFRICAN AMERICAN: >60
GLOBULIN: 4.7 G/DL (ref 2.3–3.5)
GLUCOSE BLD-MCNC: 94 MG/DL (ref 70–99)
GLUCOSE URINE: NEGATIVE MG/DL
HCT VFR BLD CALC: 36.3 % (ref 37–47)
HEMOGLOBIN: 12 G/DL (ref 12–16)
HYALINE CASTS: ABNORMAL /HPF (ref 0–5)
KETONES, URINE: NEGATIVE MG/DL
LEUKOCYTE ESTERASE, URINE: ABNORMAL
LYMPHOCYTES ABSOLUTE: 1.1 K/UL (ref 1–4.8)
LYMPHOCYTES RELATIVE PERCENT: 25 %
MAGNESIUM: 1.9 MG/DL (ref 1.7–2.4)
MCH RBC QN AUTO: 33.3 PG (ref 27–31.3)
MCHC RBC AUTO-ENTMCNC: 33.2 % (ref 33–37)
MCV RBC AUTO: 100.4 FL (ref 82–100)
MONOCYTES ABSOLUTE: 0.5 K/UL (ref 0.2–0.8)
MONOCYTES RELATIVE PERCENT: 10.9 %
NEUTROPHILS ABSOLUTE: 2.7 K/UL (ref 1.4–6.5)
NEUTROPHILS RELATIVE PERCENT: 63 %
NITRITE, URINE: POSITIVE
PDW BLD-RTO: 14.3 % (ref 11.5–14.5)
PH UA: 5 (ref 5–9)
PLATELET # BLD: 113 K/UL (ref 130–400)
POTASSIUM SERPL-SCNC: 3.5 MEQ/L (ref 3.4–4.9)
PROTEIN UA: NEGATIVE MG/DL
RBC # BLD: 3.62 M/UL (ref 4.2–5.4)
RBC UA: ABNORMAL /HPF (ref 0–5)
SODIUM BLD-SCNC: 138 MEQ/L (ref 135–144)
SPECIFIC GRAVITY UA: 1.02 (ref 1–1.03)
TOTAL PROTEIN: 8.3 G/DL (ref 6.3–8)
URINE REFLEX TO CULTURE: YES
UROBILINOGEN, URINE: 1 E.U./DL
WBC # BLD: 4.3 K/UL (ref 4.8–10.8)
WBC UA: ABNORMAL /HPF (ref 0–5)

## 2022-02-18 PROCEDURE — 96366 THER/PROPH/DIAG IV INF ADDON: CPT

## 2022-02-18 PROCEDURE — 2580000003 HC RX 258: Performed by: EMERGENCY MEDICINE

## 2022-02-18 PROCEDURE — 6360000002 HC RX W HCPCS: Performed by: EMERGENCY MEDICINE

## 2022-02-18 PROCEDURE — 36415 COLL VENOUS BLD VENIPUNCTURE: CPT

## 2022-02-18 PROCEDURE — 81001 URINALYSIS AUTO W/SCOPE: CPT

## 2022-02-18 PROCEDURE — 87077 CULTURE AEROBIC IDENTIFY: CPT

## 2022-02-18 PROCEDURE — 87186 SC STD MICRODIL/AGAR DIL: CPT

## 2022-02-18 PROCEDURE — 99285 EMERGENCY DEPT VISIT HI MDM: CPT

## 2022-02-18 PROCEDURE — 96365 THER/PROPH/DIAG IV INF INIT: CPT

## 2022-02-18 PROCEDURE — 83735 ASSAY OF MAGNESIUM: CPT

## 2022-02-18 PROCEDURE — 85025 COMPLETE CBC W/AUTO DIFF WBC: CPT

## 2022-02-18 PROCEDURE — 87086 URINE CULTURE/COLONY COUNT: CPT

## 2022-02-18 PROCEDURE — 71045 X-RAY EXAM CHEST 1 VIEW: CPT

## 2022-02-18 PROCEDURE — 80053 COMPREHEN METABOLIC PANEL: CPT

## 2022-02-18 RX ORDER — 0.9 % SODIUM CHLORIDE 0.9 %
500 INTRAVENOUS SOLUTION INTRAVENOUS ONCE
Status: COMPLETED | OUTPATIENT
Start: 2022-02-18 | End: 2022-02-18

## 2022-02-18 RX ORDER — CEPHALEXIN 500 MG/1
500 CAPSULE ORAL 2 TIMES DAILY
Qty: 14 CAPSULE | Refills: 0 | Status: SHIPPED | OUTPATIENT
Start: 2022-02-18 | End: 2022-02-25

## 2022-02-18 RX ADMIN — SODIUM CHLORIDE 500 ML: 9 INJECTION, SOLUTION INTRAVENOUS at 16:48

## 2022-02-18 RX ADMIN — CEFTRIAXONE SODIUM 1000 MG: 1 INJECTION, POWDER, FOR SOLUTION INTRAMUSCULAR; INTRAVENOUS at 17:28

## 2022-02-18 ASSESSMENT — ENCOUNTER SYMPTOMS
VOMITING: 1
NAUSEA: 1

## 2022-02-18 NOTE — ED NOTES
Bed: 18  Expected date: 2/18/22  Expected time:   Means of arrival:   Comments:  79 [place] [facility] [place] [facility] with c/o increasing confusion since this AM   VSS  18G LAC

## 2022-02-18 NOTE — ED PROVIDER NOTES
[address] [facility] ED  EMERGENCY MEDICINE     Pt Name: Kenisha Kim  MRN: [id number]  [place] 7/28/1924  Date of evaluation: 2/18/2022  PCP:    Kristi Hong MD  Provider: Mandy Borges DO    CHIEF COMPLAINT       Chief Complaint   Patient presents with    Altered Mental Status     AMS x1 day       HISTORY OF PRESENT ILLNESS    HPI     59-year-old female with history of hypertension, neuropathy presents to the emergency department with complaint of altered mental status per daughter. Patient states that [facility]. Apparently, yesterday, she had a bad lunch and stated that she felt nauseous and threw up multiple times at night. Denies any diarrhea. Patient states she has not really been eating since that happened. States that she is very thirsty and would like water. She is denying any chest pain, shortness of breath, abdominal pain, diarrhea, constipation. Denies any fevers or chills. Is not having any congestion or shortness of breath. Daughter is concerned because she has been having these \"paranoid episodes\". Daughter states that the patient has \"slowly been declining at the nursing home\". She states that she \"got paranoid because she had an unknown number on her phone which ended up being a friend. Patient ended up thinking that it was a hacker and wanted to call the police on him\". Daughter states that the patient is aware that she is being paranoid but only when she realizes what she has done. Patient is in denial at this point that anything had happened for her to be here today. Daughter does want her checked for urinary tract infection. Triage notes and Nursing notes were reviewed by myself. Any discrepancies are addressed above.     PAST MEDICAL HISTORY     Past Medical History:   Diagnosis Date    Back pain     Constipation     Diverticulosis     [facility]     H/O echocardiogram     EF normal    Hearing loss     Heart block AV third degree ([facility] [address]     HTN (hypertension)     Osteoporosis     Pacemaker     Peripheral neuropathy     Pulmonary hypertension (HCC)     Pulmonary nodule, left     Thrombocytopenia (HCC)     Tricuspid regurgitation     Tubular adenoma 2010       SURGICAL HISTORY       Past Surgical History:   Procedure Laterality Date    COLONOSCOPY  1990s    declines further-hx adenoma    HIP FRACTURE SURGERY Right 10/23/2017    TFN NAIL C-ARM RIGHT performed by Erwin Valera MD at [address] [facility] REPLAC[facility]  12/2017    pin in right hip from previous surgery did not hold, hip replaced    SHOULDER ARTHROPLASTY  1990s    right     TOTAL KNEE ARTHROPLASTY  2008    right       CURRENT MEDICATIONS       Previous Medications    BENZONATATE (TESSALON) 100 MG CAPSULE    Take 100 mg by mouth 3 times daily as needed for Cough    BISACODYL (DULCOLAX) 10 MG SUPPOSITORY    Place 10 mg rectally daily as needed for Constipation    CAPSAICIN 0.1 % CREA    Apply 1 Units topically 2 times daily    DOCUSATE SODIUM (COLACE) 100 MG CAPSULE    Take 100 mg by mouth every morning    FLUOROURACIL (EFUDEX) 5 % SOLN EXTERNAL SOLUTION    Apply topically 2 times daily    HYDROCORTISONE (ANUSOL-HC) 2.5 % RECTAL CREAM    Place rectally 2 times daily.     LACTOBACILLUS (ACIDOPHILUS PO)    Take by mouth 3 times daily    MAGNESIUM HYDROXIDE (MILK OF MAGNESIA PO)    Take 30 mLs by mouth daily as needed (hard stool)    MELATONIN 5 MG TABS TABLET    Take 5 mg by mouth nightly    POLYETHYLENE GLYCOL (GLYCOLAX) POWDER    Take 17 g by mouth daily (mix in 8oz of water)    SODIUM PHOSPHATES (FLEET ENEMA RE)    Place 1 Units rectally daily as needed (constipation)    SPIRONOLACTONE (ALDACTONE) 25 MG TABLET    TAKE 1 TABLET DAILY       ALLERGIES       Allergies   Allergen Reactions    Bactrim [Sulfamethoxazole-Trimethoprim] Rash     Rash of arms and legs       FAMILY HISTORY       Family History   Problem Relation Age of Onset    Cancer Mother    Joy Other Father         heart attack. SOCIAL HISTORY       Social History     Socioeconomic History    Marital status:      Spouse name: None    Number of children: None    Years of education: None    Highest education level: None   Occupational History    None   Tobacco Use    Smoking status: Former Smoker     Types: Cigarettes    Smokeless tobacco: Never Used   Vaping Use    Vaping Use: Never used   Substance and Sexual Activity    Alcohol use: No     Alcohol/week: 0.0 standard drinks    Drug use: No    Sexual activity: Never   Other Topics Concern    None   Social History Narrative    Lives alone     Social Determinants of Health     Financial Resource Strain:     Difficulty of Paying Living Expenses: Not on file   Food Insecurity:     Worried About Running Out of Food in the Last Year: Not on file    Caity of Food in the Last Year: Not on file   Transportation Needs:     Lack of Transportation (Medical): Not on file    Lack of Transportation (Non-Medical):  Not on file   Physical Activity:     Days of Exercise per Week: Not on file    Minutes of Exercise per Session: Not on file   Stress:     Feeling of Stress : Not on file   Social Connections:     Frequency of Communication with Friends and Family: Not on file    Frequency of Social Gatherings with Friends and Family: Not on file    Attends Anabaptism Services: Not on file    Active Member of [address] [place] [address] or Organizations: Not on file    Attends Club or Organization Meetings: Not on file    Marital Status: Not on file   Intimate Partner Violence:     Fear of Current or Ex-Partner: Not on file    Emotionally Abused: Not on file    Physically Abused: Not on file    Sexually Abused: Not on file   Housing Stability:     Unable to Pay for Housing in the Last Year: Not on file    Number of [place] in the Last Year: Not on file    Unstable Housing in the Last Year: Not on file       REVIEW OF SYSTEMS Review of Systems   Gastrointestinal: Positive for nausea and vomiting. Psychiatric/Behavioral: Positive for agitation and confusion. The patient is nervous/anxious. Except as noted above the remainder of the review of systems was reviewed and is negative. SCREENINGS                        PHYSICAL EXAM    (up to 7 for level 4, 8 or more for level 5)     ED Triage Vitals [02/18/22 1550]   BP Temp Temp Source Pulse Resp SpO2 Height Weight   133/67 97.1 °F (36.2 °C) Oral 63 14 99 % 5' 5\" (1.651 m) 140 lb (63.5 kg)       Physical Exam  Constitutional:       General: She is not in acute distress. Appearance: Normal appearance. She is normal weight. She is not ill-appearing. HENT:      Head: Normocephalic and atraumatic. Right Ear: External ear normal.      Left Ear: External ear normal.      Nose: Nose normal. No congestion or rhinorrhea. Mouth/Throat:      Mouth: Mucous membranes are moist.      Pharynx: No oropharyngeal exudate or posterior oropharyngeal erythema. Eyes:      General:         Right eye: No discharge. Left eye: No discharge. Extraocular Movements: Extraocular movements intact. Pupils: Pupils are equal, round, and reactive to light. Cardiovascular:      Rate and Rhythm: Normal rate and regular rhythm. Pulses: Normal pulses. Pulmonary:      Effort: Pulmonary effort is normal.      Breath sounds: Normal breath sounds. Abdominal:      General: Abdomen is flat. Bowel sounds are normal. There is no distension. Tenderness: There is no abdominal tenderness. There is no right CVA tenderness, left CVA tenderness, guarding or rebound. Musculoskeletal:         General: No swelling or tenderness. Normal range of motion. Cervical back: Normal range of motion and neck supple. Right lower leg: No edema. Left lower leg: No edema. Skin:     General: Skin is warm and dry. Capillary Refill: Capillary refill takes less than 2 seconds. Neurological:      General: No focal deficit present. Mental Status: She is alert and oriented to person, place, and time. GCS: GCS eye subscore is 4. GCS verbal subscore is 5. GCS motor subscore is 6. Psychiatric:         Mood and Affect: Mood normal.           DIAGNOSTIC RESULTS     EKG:(none if blank)  All EKGs are interpreted by the Emergency Department Physician who either signs or Co-signs this chart in the absence of a cardiologist.        RADIOLOGY: (none if blank)   I directly visualized the following images and reviewed the radiologist interpretations. Interpretation per the Radiologist below, if available at the time of this note:  XR CHEST PORTABLE    (Results Pending)       LABS:  Labs Reviewed   COMPREHENSIVE METABOLIC PANEL - Abnormal; Notable for the following components:       Result Value    BUN 28 (*)     Total Protein 8.3 (*)     Globulin 4.7 (*)     All other components within normal limits   CBC WITH AUTO DIFFERENTIAL - Abnormal; Notable for the following components:    WBC 4.3 (*)     RBC 3.62 (*)     Hematocrit 36.3 (*)     .4 (*)     MCH 33.3 (*)     Platelets 892 (*)     All other components within normal limits   URINALYSIS WITH REFLEX TO CULTURE - Abnormal; Notable for the following components:    Clarity, UA CLOUDY (*)     Blood, Urine TRACE (*)     Nitrite, Urine POSITIVE (*)     Leukocyte Esterase, Urine MODERATE (*)     All other components within normal limits   MICROSCOPIC URINALYSIS - Abnormal; Notable for the following components:    Bacteria, UA MANY (*)     WBC, UA  (*)     RBC, UA 3-5 (*)     All other components within normal limits   CULTURE, URINE   MAGNESIUM       All other labs were within normal range or not returned as of this dictation. Please note, any cultures that may have been sent were not resulted at the time of this patient visit.     EMERGENCY DEPARTMENT COURSE and Medical Decision Making:     Vitals:    Vitals:    02/18/22 1550 02/18/22 1600 02/18/22 1630 02/18/22 1700   BP: 133/67 (!) 116/56 112/76 (!) 127/54   Pulse: 63 64 62 67   Resp: 14 15 17 16   Temp: 97.1 °F (36.2 °C)      TempSrc: Oral      SpO2: 99% 98% 98% 98%   Weight: 140 lb (63.5 kg)      Height: 5' 5\" (1.651 m)          PROCEDURES: (None if blank)  Procedures       MDM    Patient is alert and oriented x4. Normal vitals afebrile. Patient was found to have a urinary tract infection which may be the reason for her delirium. We will get a dose of Rocephin before discharge. Discharge with Keflex twice per day for 7 days. I did speak to daughter about this will fill the prescription for her and drop it off to [facility]. Patient is able to drink water here without any difficulty. States that she feels fine to go back. Strict return precautions and follow up instructions were discussed with the patient with which the patient agrees    ED Medications administered this visit:    Medications   0.9 % sodium chloride bolus (500 mLs IntraVENous New Bag 2/18/22 1648)   cefTRIAXone (ROCEPHIN) 1000 mg IVPB in 50 mL D5W minibag (1,000 mg IntraVENous New Bag 2/18/22 1728)         FINAL IMPRESSION      1.  Acute cystitis without hematuria          DISPOSITION/PLAN   DISPOSITION        PATIENT REFERRED TO:  Samson Martinez MD  [facility] [address] [id number]  [phone]            DISCHARGE MEDICATIONS:  New Prescriptions    CEPHALEXIN (KEFLEX) 500 MG CAPSULE    Take 1 capsule by mouth 2 times daily for 7 days              Sohail Howard DO (electronically signed)  Attending Physician, Emergency Department         Sohail Howard DO  02/18/22 1800

## 2022-02-18 NOTE — ED TRIAGE NOTES
Pt presents to ED via EMS with altered mental status. Per daughter, pt has been confused and behaving in a paranoid manner x1 day. Daughter wants pt checked for UTI. Pt is a&o x4, calm and cooperative.

## 2022-02-20 LAB
ORGANISM: ABNORMAL
URINE CULTURE, ROUTINE: ABNORMAL

## 2022-04-29 ENCOUNTER — OFFICE VISIT (OUTPATIENT)
Dept: GERIATRIC MEDICINE | Age: 87
End: 2022-04-29
Payer: MEDICARE

## 2022-04-29 DIAGNOSIS — F29 PSYCHOSIS, UNSPECIFIED PSYCHOSIS TYPE (HCC): Primary | ICD-10-CM

## 2022-04-29 PROCEDURE — 99305 1ST NF CARE MODERATE MDM 35: CPT | Performed by: PSYCHIATRY & NEUROLOGY

## 2022-05-02 NOTE — PROGRESS NOTES
Alvarez Dennis     Psychiatry Initial evaluation    PLEASE NOTE THAT THIS DOCUMENTATION DOES NOT INCLUDE RECONCILIATION OF PATIENT PHYSICAL MEDICATION BUT FOCUSED ON PSYCHOTROPICS. ALL MEDICATION REVIEWED PER MAR THAT HAS BEEN PRINTED FOR ME FOR THE ASSESSMENT    CHIEF COMPLAINT:  Psychosis    History obtained from:  patient    Patient was seen after discussing with the treatment team and reviewing the chart      HISTORY OF PRESENT ILLNESS:      The patient is a 80 y.o. female with significant past history of multiple medical issues    Pt has been feeling more paranoid, believing her room was being bugged. Pt called the police and was getting easily agitated. She was at risk of fall and has been finding difficult to redirect. Pt was recently moved to SNF from [facility] sec to her current mental state. Pt talking to me as if she knew me from the past. She think that my wife is in danger. Asking me to protect her and myself from impending danger  Pt has been labile and tearful  Markedly anxious    The patient is not currently receiving care for the above psychiatric illness. MEDICATIONS: No current facility-administered medications for this visit.     Compliance:n/a    Psychiatric Review of Systems       Depression: no     Talia or Hypomania:  no     Panic Attacks:  yes     Phobias:  no     Obsessions and Compulsions:  no     PTSD : no     Hallucinations:  yes      Delusions:  yes     Substance Abuse History:  ETOH: no   Marijuana: no  Opiates: no  Other Drugs: no      Past Psychiatric History:  No past mental illness    Past Medical History:        Diagnosis Date    Back pain     Constipation     Diverticulosis     [facility] thrombosis) ([facility])     H/O echocardiogram     EF normal    Hearing loss     Heart block AV third degree (HCC)     HTN (hypertension)     Osteoporosis     Pacemaker     Peripheral neuropathy     Pulmonary hypertension (HCC)     Pulmonary nodule, left     Thrombocytopenia ([facility] [address]     Tricuspid regurgitation     Tubular adenoma 2010       Past Surgical History:        Procedure Laterality Date    COLONOSCOPY  1990s    declines further-hx adenoma    HIP FRACTURE SURGERY Right 10/23/2017    TFN NAIL C-ARM RIGHT performed by Kenyatta Khan MD at [address] [facility] REPLACEMENT  12/2017    pin in right hip from previous surgery did not hold, hip replaced    SHOULDER ARTHROPLASTY  1990s    right     TOTAL KNEE ARTHROPLASTY  2008    right       Allergies:   Bactrim [sulfamethoxazole-trimethoprim]    Family History  Family History   Problem Relation Age of Onset    Cancer Mother     Other Father         heart attack. Social History:  Social History     Socioeconomic History    Marital status:      Spouse name: Not on file    Number of children: Not on file    Years of education: Not on file    Highest education level: Not on file   Occupational History    Not on file   Tobacco Use    Smoking status: Former Smoker     Types: Cigarettes    Smokeless tobacco: Never Used   Vaping Use    Vaping Use: Never used   Substance and Sexual Activity    Alcohol use: No     Alcohol/week: 0.0 standard drinks    Drug use: No    Sexual activity: Never   Other Topics Concern    Not on file   Social History Narrative    Lives alone     Social Determinants of Health     Financial Resource Strain:     Difficulty of Paying Living Expenses: Not on file   Food Insecurity:     Worried About [address] in the Last Year: Not on file    Caity of Food in the Last Year: Not on file   Transportation Needs:     Lack of Transportation (Medical): Not on file    Lack of Transportation (Non-Medical):  Not on file   Physical Activity:     Days of Exercise per Week: Not on file    Minutes of Exercise per Session: Not on file   Stress:     Feeling of Stress : Not on file   Social Connections:     Frequency of Communication with Friends and Family: Not on file    Frequency of Social Gatherings with Friends and Family: Not on file    Attends Taoist Services: Not on file    Active Member of Clubs or Organizations: Not on file    Attends Club or Organization Meetings: Not on file    Marital Status: Not on file   Intimate Partner Violence:     Fear of Current or Ex-Partner: Not on file    Emotionally Abused: Not on file    Physically Abused: Not on file    Sexually Abused: Not on file   Housing Stability:     Unable to Pay for Housing in the Last Year: Not on file    Number of [place] in the Last Year: Not on file    Unstable Housing in the Last Year: Not on file       EXAMINATION:    REVIEW OF SYSTEMS:    ROS:  [x] All negative/unchanged except if checked. Explain positive(checked items) below:  [] Constitutional  [] Eyes  [] Ear/Nose/Mouth/Throat  [] Respiratory  [] CV  [] GI  []   [] Musculoskeletal  [] Skin/Breast  [] Neurological  [] Endocrine  [] Heme/Lymph  [] Allergic/Immunologic    Explanation:     Vitals:  LMP  (LMP Unknown)      Neurologic Exam:   Muscle Strength & Tone: full ROM  Gait: in chair  Involuntary Movements: No    Mental Status Examination:    Level of consciousness:  within normal limits   Appearance:  ill-appearing  Behavior/Motor:  psychomotor retardation  Attitude toward examiner:  guarded  Speech:  rapid   Mood: labile  Affect:  mood incongruent  Thought processes:  rapid   Association disorganized  Thought content:  Delusions:  paranoid  Cognition:  disoriented to person, place, and time   Attention & Concentration poor  Memory impaired recent memory  Insight poor   Judgement poor   Fund of Knowledge limited    Mini Mental Status not completed      DIAGNOSIS:     Psychosis UNspecified   Senile dementia with delirium          LABS:   No visits with results within 2 Day(s) from this visit.    Latest known visit with results is:   Admission on 02/18/2022, Discharged on 02/18/2022   Component Date Value Ref Range Status    Sodium 02/18/2022 138  135 - 144 mEq/L Final    Potassium 02/18/2022 3.5  3.4 - 4.9 mEq/L Final    Chloride 02/18/2022 103  95 - 107 mEq/L Final    CO2 02/18/2022 25  20 - 31 mEq/L Final    Anion Gap 02/18/2022 10  9 - 15 mEq/L Final    Glucose 02/18/2022 94  70 - 99 mg/dL Final    BUN 02/18/2022 28* 8 - 23 mg/dL Final    CREATININE 02/18/2022 0.62  0.50 - 0.90 mg/dL Final    GFR Non- 02/18/2022 >60.0  >60 Final    Comment: >60 mL/min/1.73m2 EGFR, calc. for ages 25 and older using the  MDRD formula (not corrected for weight), is valid for stable  renal function.  GFR  02/18/2022 >60.0  >60 Final    Comment: >60 mL/min/1.73m2 EGFR, calc. for ages 25 and older using the  MDRD formula (not corrected for weight), is valid for stable  renal function.       Calcium 02/18/2022 8.6  8.5 - 9.9 mg/dL Final    Total Protein 02/18/2022 8.3* 6.3 - 8.0 g/dL Final    Albumin 02/18/2022 3.6  3.5 - 4.6 g/dL Final    Total Bilirubin 02/18/2022 0.4  0.2 - 0.7 mg/dL Final    Alkaline Phosphatase 02/18/2022 103  40 - 130 U/L Final    ALT 02/18/2022 17  0 - 33 U/L Final    AST 02/18/2022 23  0 - 35 U/L Final    Globulin 02/18/2022 4.7* 2.3 - 3.5 g/dL Final    WBC 02/18/2022 4.3* 4.8 - 10.8 K/uL Final    RBC 02/18/2022 3.62* 4.20 - 5.40 M/uL Final    Hemoglobin 02/18/2022 12.0  12.0 - 16.0 g/dL Final    Hematocrit 02/18/2022 36.3* 37.0 - 47.0 % Final    MCV 02/18/2022 100.4* 82.0 - 100.0 fL Final    MCH 02/18/2022 33.3* 27.0 - 31.3 pg Final    MCHC 02/18/2022 33.2  33.0 - 37.0 % Final    RDW 02/18/2022 14.3  11.5 - 14.5 % Final    Platelets 72/18/2296 113* 130 - 400 K/uL Final    Neutrophils % 02/18/2022 63.0  % Final    Lymphocytes % 02/18/2022 25.0  % Final    Monocytes % 02/18/2022 10.9  % Final    Eosinophils % 02/18/2022 0.9  % Final    Basophils % 02/18/2022 0.2  % Final    Neutrophils Absolute 02/18/2022 2.7  1.4 - 6.5 K/uL Final    Lymphocytes Absolute 02/18/2022 1.1  1.0 - 4.8 K/uL Final    Monocytes Absolute 02/18/2022 0.5  0.2 - 0.8 K/uL Final    Eosinophils Absolute 02/18/2022 0.0  0.0 - 0.7 K/uL Final    Basophils Absolute 02/18/2022 0.0  0.0 - 0.2 K/uL Final    Magnesium 02/18/2022 1.9  1.7 - 2.4 mg/dL Final    Color, UA 02/18/2022 Yellow  Straw/Yellow Final    Clarity, UA 02/18/2022 CLOUDY* Clear Final    Glucose, Ur 02/18/2022 Negative  Negative mg/dL Final    Bilirubin Urine 02/18/2022 Negative  Negative Final    Ketones, Urine 02/18/2022 Negative  Negative mg/dL Final    Specific [place], UA 02/18/2022 1.019  1.005 - 1.030 Final    Blood, Urine 02/18/2022 TRACE* Negative Final    pH, UA 02/18/2022 5.0  5.0 - 9.0 Final    Protein, UA 02/18/2022 Negative  Negative mg/dL Final    Urobilinogen, Urine 02/18/2022 1.0  <2.0 E.U./dL Final    Nitrite, Urine 02/18/2022 POSITIVE* Negative Final    Leukocyte Esterase, Urine 02/18/2022 MODERATE* Negative Final    Urine Reflex to Culture 02/18/2022 Yes   Final    Bacteria, UA 02/18/2022 MANY* Negative /HPF Final    Hyaline Casts, UA 02/18/2022 5-10  0 - 5 /HPF Final    WBC, UA 02/18/2022 * 0 - 5 /HPF Final    RBC, UA 02/18/2022 3-5* 0 - 5 /HPF Final    Epithelial Cells, UA 02/18/2022 0-2  0 - 5 /HPF Final    Organism 02/18/2022 Escherichia coli*  Final    Urine Culture, Routine 02/18/2022 >100,000 CFU/ml   Final           Radiology   No results found. EKG: TRACING REVIEWED    TREATMENT PLAN:    New Medications started during this evaluation :    Risperdal 0.25 mg po bid started for psychosis  Follow up in 2 weeks      Discussed with the patient risk, benefit, alternative and common side effects for the  proposed medication treatment. Patient is consenting to the treatment.     Electronically signed by Ga Ariza MD on 5/2/2022 at 4:55 PM

## 2022-05-10 ENCOUNTER — OFFICE VISIT (OUTPATIENT)
Dept: GERIATRIC MEDICINE | Age: 87
End: 2022-05-10
Payer: MEDICARE

## 2022-05-10 DIAGNOSIS — F43.21 ADJUSTMENT DISORDER WITH DEPRESSED MOOD: ICD-10-CM

## 2022-05-10 DIAGNOSIS — F29 PSYCHOSIS, UNSPECIFIED PSYCHOSIS TYPE (HCC): Primary | ICD-10-CM

## 2022-05-10 PROCEDURE — 99309 SBSQ NF CARE MODERATE MDM 30: CPT | Performed by: PSYCHIATRY & NEUROLOGY

## 2022-05-10 NOTE — PROGRESS NOTES
[facility] [address] FOLLOW-UP NOTE     PLEASE NOTE THAT THIS DOCUMENTATION DOES NOT INCLUDE RECONCILIATION OF PATIENT PHYSICAL MEDICATION BUT FOCUSED ON PSYCHOTROPICS. ALL MEDICATION REVIEWED PER MAR THAT HAS BEEN PRINTED FOR ME FOR THE ASSESSMENT      5/10/2022     Patient was seen and examined in person, Chart reviewed   Patient's case discussed with staff/team    Chief Complaint: Depression, behavior    Interim History:     Pt has been tolerating medication and has been doing better with confusion and hallucination  However she still struggle with the move from snf- she report that she was happy with the snf and does not like here. Pt could not see the difference between the 2 floors. Pt continue to remain fall risk  Pt was threatening to shoot herself and when asked about it she laughed at it- \"did they take it seriously.  I do not have any intention, I do not have a gun\"  Pt report that she was trying to get the attention of the staff but did not have any suicidal ideation      Appetite:   [x] Normal/Unchanged  [] Increased  [] Decreased      Sleep:       [x] Normal/Unchanged  [] Fair       [] Poor              Energy:    [x] Normal/Unchanged  [] Increased  [] Decreased        SI [] Present  [x] Absent    HI  []Present  [x] Absent     Aggression:  [] yes  [x] no    Patient is [] able  [] unable to CONTRACT FOR SAFETY     PAST MEDICAL/PSYCHIATRIC HISTORY:   Past Medical History:   Diagnosis Date    Back pain     Constipation     Diverticulosis     [facility]) ([facility] [address]     H/O echocardiogram     EF normal    Hearing loss     Heart block AV third degree ([facility] [address]     HTN (hypertension)     Osteoporosis     Pacemaker     Peripheral neuropathy     Pulmonary hypertension ([facility] [address]     Pulmonary nodule, left     Thrombocytopenia (HCC)     Tricuspid regurgitation     Tubular adenoma 2010       FAMILY/SOCIAL HISTORY:  Family History   Problem Relation Age of Onset    Cancer Mother    Yeimi Urban Other Father         heart attack. Social History     Socioeconomic History    Marital status:      Spouse name: Not on file    Number of children: Not on file    Years of education: Not on file    Highest education level: Not on file   Occupational History    Not on file   Tobacco Use    Smoking status: Former Smoker     Types: Cigarettes    Smokeless tobacco: Never Used   Vaping Use    Vaping Use: Never used   Substance and Sexual Activity    Alcohol use: No     Alcohol/week: 0.0 standard drinks    Drug use: No    Sexual activity: Never   Other Topics Concern    Not on file   Social History Narrative    Lives alone     Social Determinants of Health     Financial Resource Strain:     Difficulty of Paying Living Expenses: Not on file   Food Insecurity:     Worried About [address] in the Last Year: Not on file    Caity of Food in the Last Year: Not on file   Transportation Needs:     Lack of Transportation (Medical): Not on file    Lack of Transportation (Non-Medical):  Not on file   Physical Activity:     Days of Exercise per Week: Not on file    Minutes of Exercise per Session: Not on file   Stress:     Feeling of Stress : Not on file   Social Connections:     Frequency of Communication with Friends and Family: Not on file    Frequency of Social Gatherings with Friends and Family: Not on file    Attends Congregational Services: Not on file    Active Member of [address] [place] [address] or Organizations: Not on file    Attends Club or Organization Meetings: Not on file    Marital Status: Not on file   Intimate Partner Violence:     Fear of Current or Ex-Partner: Not on file    Emotionally Abused: Not on file    Physically Abused: Not on file    Sexually Abused: Not on file   Housing Stability:     Unable to Pay for Housing in the Last Year: Not on file    Number of [place] in the Last Year: Not on file    Unstable Housing in the Last Year: Not on file ROS:  [x] All negative/unchanged except if checked. Explain positive(checked items) below:  [] Constitutional  [] Eyes  [] Ear/Nose/Mouth/Throat  [] Respiratory  [] CV  [] GI  []   [] Musculoskeletal  [] Skin/Breast  [] Neurological  [] Endocrine  [] Heme/Lymph  [] Allergic/Immunologic    Explanation:     MEDICATIONS:  PER MAR reviewed  MEDICATION SIDE EFFECTS: no      Examination:  LMP  (LMP Unknown)   Gait -in chair    Mental Status Examination:    Level of consciousness:  within normal limits   Appearance:  ill-appearing  Behavior/Motor:  psychomotor retardation  Attitude toward examiner:  guarded  Speech:  rapid   Mood: labile  Affect:  mood incongruent  Thought processes:  rapid   Association disorganized  Thought content:  Delusions:  paranoid  Cognition:  disoriented to person, place, and time   Attention & Concentration poor  Memory impaired recent memory  Insight poor   Judgement poor   Fund of Knowledge limited    Mini Mental Status not completed      DIAGNOSIS:   adjustment disorder depressed mood   Psychosis UNspecified   Senile dementia with delirium      LABS:  No visits with results within 2 Day(s) from this visit. Latest known visit with results is:   Admission on 02/18/2022, Discharged on 02/18/2022   Component Date Value Ref Range Status    Sodium 02/18/2022 138  135 - 144 mEq/L Final    Potassium 02/18/2022 3.5  3.4 - 4.9 mEq/L Final    Chloride 02/18/2022 103  95 - 107 mEq/L Final    CO2 02/18/2022 25  20 - 31 mEq/L Final    Anion Gap 02/18/2022 10  9 - 15 mEq/L Final    Glucose 02/18/2022 94  70 - 99 mg/dL Final    BUN 02/18/2022 28* 8 - 23 mg/dL Final    CREATININE 02/18/2022 0.62  0.50 - 0.90 mg/dL Final    GFR Non- 02/18/2022 >60.0  >60 Final    Comment: >60 mL/min/1.73m2 EGFR, calc. for ages 25 and older using the  MDRD formula (not corrected for weight), is valid for stable  renal function.       GFR  02/18/2022 >60.0  >60 Final Comment: >60 mL/min/1.73m2 EGFR, calc. for ages 25 and older using the  MDRD formula (not corrected for weight), is valid for stable  renal function.       Calcium 02/18/2022 8.6  8.5 - 9.9 mg/dL Final    Total Protein 02/18/2022 8.3* 6.3 - 8.0 g/dL Final    Albumin 02/18/2022 3.6  3.5 - 4.6 g/dL Final    Total Bilirubin 02/18/2022 0.4  0.2 - 0.7 mg/dL Final    Alkaline Phosphatase 02/18/2022 103  40 - 130 U/L Final    ALT 02/18/2022 17  0 - 33 U/L Final    AST 02/18/2022 23  0 - 35 U/L Final    Globulin 02/18/2022 4.7* 2.3 - 3.5 g/dL Final    WBC 02/18/2022 4.3* 4.8 - 10.8 K/uL Final    RBC 02/18/2022 3.62* 4.20 - 5.40 M/uL Final    Hemoglobin 02/18/2022 12.0  12.0 - 16.0 g/dL Final    Hematocrit 02/18/2022 36.3* 37.0 - 47.0 % Final    MCV 02/18/2022 100.4* 82.0 - 100.0 fL Final    MCH 02/18/2022 33.3* 27.0 - 31.3 pg Final    MCHC 02/18/2022 33.2  33.0 - 37.0 % Final    RDW 02/18/2022 14.3  11.5 - 14.5 % Final    Platelets 13/40/6219 113* 130 - 400 K/uL Final    Neutrophils % 02/18/2022 63.0  % Final    Lymphocytes % 02/18/2022 25.0  % Final    Monocytes % 02/18/2022 10.9  % Final    Eosinophils % 02/18/2022 0.9  % Final    Basophils % 02/18/2022 0.2  % Final    Neutrophils Absolute 02/18/2022 2.7  1.4 - 6.5 K/uL Final    Lymphocytes Absolute 02/18/2022 1.1  1.0 - 4.8 K/uL Final    Monocytes Absolute 02/18/2022 0.5  0.2 - 0.8 K/uL Final    Eosinophils Absolute 02/18/2022 0.0  0.0 - 0.7 K/uL Final    Basophils Absolute 02/18/2022 0.0  0.0 - 0.2 K/uL Final    Magnesium 02/18/2022 1.9  1.7 - 2.4 mg/dL Final    Color, UA 02/18/2022 Yellow  Straw/Yellow Final    Clarity, UA 02/18/2022 CLOUDY* Clear Final    Glucose, Ur 02/18/2022 Negative  Negative mg/dL Final    Bilirubin Urine 02/18/2022 Negative  Negative Final    Ketones, Urine 02/18/2022 Negative  Negative mg/dL Final    Specific [place], UA 02/18/2022 1.019  1.005 - 1.030 Final    Blood, Urine 02/18/2022 TRACE* Negative Final    pH, UA 02/18/2022 5.0  5.0 - 9.0 Final    Protein, UA 02/18/2022 Negative  Negative mg/dL Final    Urobilinogen, Urine 02/18/2022 1.0  <2.0 E.U./dL Final    Nitrite, Urine 02/18/2022 POSITIVE* Negative Final    Leukocyte Esterase, Urine 02/18/2022 MODERATE* Negative Final    Urine Reflex to Culture 02/18/2022 Yes   Final    Bacteria, UA 02/18/2022 MANY* Negative /HPF Final    Hyaline Casts, UA 02/18/2022 5-10  0 - 5 /HPF Final    WBC, UA 02/18/2022 * 0 - 5 /HPF Final    RBC, UA 02/18/2022 3-5* 0 - 5 /HPF Final    Epithelial Cells, UA 02/18/2022 0-2  0 - 5 /HPF Final    Organism 02/18/2022 Escherichia coli*  Final    Urine Culture, Routine 02/18/2022 >100,000 CFU/ml   Final         RISK ASSESSMENT: Low risk of suicide    Treatment Plan:  Reviewed current Medications with the patient. Pt remain attention seeking, demanding to be moved to her old place  Recommend 15 min check until she get used to this place just to make sure she does not do anything impulsively    Risks, benefits, side effects, drug-to-drug interactions and alternatives to treatment were discussed. Discussed with the nurse about the treatment plan.   Future labs   Call me if needed   Follow up in 2 weeks      GRADUAL DOSE REDUCTION ATTEMPTED : [] YES    [x] NO      REASON FOR NOT REDUCING MEDICATION DOSE:   [] NA  [x] HIGH RISK OF PATIENT DETERIORATION  [] MEDICATION RECENTLY REDUCED  [] PRIOR MEDICATION DOSE REDUCTION UNSUCCESSFUL  [] Other            Electronically signed by Stalin Milligan MD on 5/10/2022 at 5:33 PM

## 2022-05-25 ENCOUNTER — OFFICE VISIT (OUTPATIENT)
Dept: GERIATRIC MEDICINE | Age: 87
End: 2022-05-25
Payer: MEDICARE

## 2022-05-25 DIAGNOSIS — F03.91 DEMENTIA WITH BEHAVIORAL DISTURBANCE, UNSPECIFIED DEMENTIA TYPE: ICD-10-CM

## 2022-05-25 DIAGNOSIS — F29 PSYCHOSIS, UNSPECIFIED PSYCHOSIS TYPE (HCC): Primary | ICD-10-CM

## 2022-05-25 PROCEDURE — 99308 SBSQ NF CARE LOW MDM 20: CPT | Performed by: PSYCHIATRY & NEUROLOGY

## 2022-05-25 PROCEDURE — 1123F ACP DISCUSS/DSCN MKR DOCD: CPT | Performed by: PSYCHIATRY & NEUROLOGY

## 2022-05-25 NOTE — PROGRESS NOTES
Never Used   Vaping Use    Vaping Use: Never used   Substance and Sexual Activity    Alcohol use: No     Alcohol/week: 0.0 standard drinks    Drug use: No    Sexual activity: Never   Other Topics Concern    Not on file   Social History Narrative    Lives alone     Social Determinants of Health     Financial Resource Strain:     Difficulty of Paying Living Expenses: Not on file   Food Insecurity:     Worried About Running Out of Food in the Last Year: Not on file    Caity of Food in the Last Year: Not on file   Transportation Needs:     Lack of Transportation (Medical): Not on file    Lack of Transportation (Non-Medical): Not on file   Physical Activity:     Days of Exercise per Week: Not on file    Minutes of Exercise per Session: Not on file   Stress:     Feeling of Stress : Not on file   Social Connections:     Frequency of Communication with Friends and Family: Not on file    Frequency of Social Gatherings with Friends and Family: Not on file    Attends Restoration Services: Not on file    Active Member of [address] [place] [address] or Organizations: Not on file    Attends Club or Organization Meetings: Not on file    Marital Status: Not on file   Intimate Partner Violence:     Fear of Current or Ex-Partner: Not on file    Emotionally Abused: Not on file    Physically Abused: Not on file    Sexually Abused: Not on file   Housing Stability:     Unable to Pay for Housing in the Last Year: Not on file    Number of [place] in the Last Year: Not on file    Unstable Housing in the Last Year: Not on file           ROS:  [x] All negative/unchanged except if checked.  Explain positive(checked items) below:  [] Constitutional  [] Eyes  [] Ear/Nose/Mouth/Throat  [] Respiratory  [] CV  [] GI  []   [] Musculoskeletal  [] Skin/Breast  [] Neurological  [] Endocrine  [] Heme/Lymph  [] Allergic/Immunologic    Explanation:     MEDICATIONS:  PER MAR reviewed  MEDICATION SIDE EFFECTS: no      Examination:  LMP  (LMP Unknown)   Gait -in chair    Mental Status Examination:    Level of consciousness:  within normal limits   Appearance:  ill-appearing  Behavior/Motor:  psychomotor retardation  Attitude toward examiner:  guarded  Speech:  rapid   Mood: labile  Affect:  mood incongruent  Thought processes:  rapid   Association disorganized  Thought content:  Delusions: less paranoid  Cognition:  disoriented to person, place, and time   Attention & Concentration poor  Memory impaired recent memory  Insight poor   Judgement poor   Fund of Knowledge limited    Mini Mental Status not completed      DIAGNOSIS:   Adjustment disorder depressed mood   Psychosis UNspecified   Senile dementia with delirium      LABS:  No visits with results within 2 Day(s) from this visit. Latest known visit with results is:   Admission on 02/18/2022, Discharged on 02/18/2022   Component Date Value Ref Range Status    Sodium 02/18/2022 138  135 - 144 mEq/L Final    Potassium 02/18/2022 3.5  3.4 - 4.9 mEq/L Final    Chloride 02/18/2022 103  95 - 107 mEq/L Final    CO2 02/18/2022 25  20 - 31 mEq/L Final    Anion Gap 02/18/2022 10  9 - 15 mEq/L Final    Glucose 02/18/2022 94  70 - 99 mg/dL Final    BUN 02/18/2022 28* 8 - 23 mg/dL Final    CREATININE 02/18/2022 0.62  0.50 - 0.90 mg/dL Final    GFR Non- 02/18/2022 >60.0  >60 Final    Comment: >60 mL/min/1.73m2 EGFR, calc. for ages 25 and older using the  MDRD formula (not corrected for weight), is valid for stable  renal function.  GFR  02/18/2022 >60.0  >60 Final    Comment: >60 mL/min/1.73m2 EGFR, calc. for ages 25 and older using the  MDRD formula (not corrected for weight), is valid for stable  renal function.       Calcium 02/18/2022 8.6  8.5 - 9.9 mg/dL Final    Total Protein 02/18/2022 8.3* 6.3 - 8.0 g/dL Final    Albumin 02/18/2022 3.6  3.5 - 4.6 g/dL Final    Total Bilirubin 02/18/2022 0.4  0.2 - 0.7 mg/dL Final    Alkaline Phosphatase 02/18/2022 103  40 - 130 U/L Final    ALT 02/18/2022 17  0 - 33 U/L Final    AST 02/18/2022 23  0 - 35 U/L Final    Globulin 02/18/2022 4.7* 2.3 - 3.5 g/dL Final    WBC 02/18/2022 4.3* 4.8 - 10.8 K/uL Final    RBC 02/18/2022 3.62* 4.20 - 5.40 M/uL Final    Hemoglobin 02/18/2022 12.0  12.0 - 16.0 g/dL Final    Hematocrit 02/18/2022 36.3* 37.0 - 47.0 % Final    MCV 02/18/2022 100.4* 82.0 - 100.0 fL Final    MCH 02/18/2022 33.3* 27.0 - 31.3 pg Final    MCHC 02/18/2022 33.2  33.0 - 37.0 % Final    RDW 02/18/2022 14.3  11.5 - 14.5 % Final    Platelets 97/37/0139 113* 130 - 400 K/uL Final    Neutrophils % 02/18/2022 63.0  % Final    Lymphocytes % 02/18/2022 25.0  % Final    Monocytes % 02/18/2022 10.9  % Final    Eosinophils % 02/18/2022 0.9  % Final    Basophils % 02/18/2022 0.2  % Final    Neutrophils Absolute 02/18/2022 2.7  1.4 - 6.5 K/uL Final    Lymphocytes Absolute 02/18/2022 1.1  1.0 - 4.8 K/uL Final    Monocytes Absolute 02/18/2022 0.5  0.2 - 0.8 K/uL Final    Eosinophils Absolute 02/18/2022 0.0  0.0 - 0.7 K/uL Final    Basophils Absolute 02/18/2022 0.0  0.0 - 0.2 K/uL Final    Magnesium 02/18/2022 1.9  1.7 - 2.4 mg/dL Final    Color, UA 02/18/2022 Yellow  Straw/Yellow Final    Clarity, UA 02/18/2022 CLOUDY* Clear Final    Glucose, Ur 02/18/2022 Negative  Negative mg/dL Final    Bilirubin Urine 02/18/2022 Negative  Negative Final    Ketones, Urine 02/18/2022 Negative  Negative mg/dL Final    Specific [place], UA 02/18/2022 1.019  1.005 - 1.030 Final    Blood, Urine 02/18/2022 TRACE* Negative Final    pH, UA 02/18/2022 5.0  5.0 - 9.0 Final    Protein, UA 02/18/2022 Negative  Negative mg/dL Final    Urobilinogen, Urine 02/18/2022 1.0  <2.0 E.U./dL Final    Nitrite, Urine 02/18/2022 POSITIVE* Negative Final    Leukocyte Esterase, Urine 02/18/2022 MODERATE* Negative Final    Urine Reflex to Culture 02/18/2022 Yes   Final    Bacteria, UA 02/18/2022 MANY* Negative /HPF Final    Hyaline Casts, UA 02/18/2022 5-10  0 - 5 /HPF Final    WBC, UA 02/18/2022 * 0 - 5 /HPF Final    RBC, UA 02/18/2022 3-5* 0 - 5 /HPF Final    Epithelial Cells, UA 02/18/2022 0-2  0 - 5 /HPF Final    Organism 02/18/2022 Escherichia coli*  Final    Urine Culture, Routine 02/18/2022 >100,000 CFU/ml   Final         RISK ASSESSMENT: Low risk of suicide    Treatment Plan:  Reviewed current Medications with the patient. Pt remain attention seeking, demanding to be moved to her old place  Recommend 15 min check until she get used to this place just to make sure she does not do anything impulsively    Risks, benefits, side effects, drug-to-drug interactions and alternatives to treatment were discussed. Discussed with the nurse about the treatment plan.   Future labs   Call me if needed   Follow up in 4 weeks      GRADUAL DOSE REDUCTION ATTEMPTED : [] YES    [x] NO      REASON FOR NOT REDUCING MEDICATION DOSE:   [] NA  [x] HIGH RISK OF PATIENT DETERIORATION  [] MEDICATION RECENTLY REDUCED  [] PRIOR MEDICATION DOSE REDUCTION UNSUCCESSFUL  [] Other            Electronically signed by Javi Dorsey MD on 5/25/2022 at 6:31 PM

## 2022-05-27 ENCOUNTER — HOSPITAL ENCOUNTER (EMERGENCY)
Age: 87
Discharge: HOME | End: 2022-05-27
Payer: MEDICARE

## 2022-05-27 ENCOUNTER — APPOINTMENT (OUTPATIENT)
Dept: GENERAL RADIOLOGY | Age: 87
End: 2022-05-27
Payer: MEDICARE

## 2022-05-27 VITALS
SYSTOLIC BLOOD PRESSURE: 110 MMHG | HEIGHT: 65 IN | OXYGEN SATURATION: 96 % | RESPIRATION RATE: 16 BRPM | WEIGHT: 126 LBS | DIASTOLIC BLOOD PRESSURE: 67 MMHG | TEMPERATURE: 98.6 F | BODY MASS INDEX: 20.99 KG/M2 | HEART RATE: 65 BPM

## 2022-05-27 DIAGNOSIS — L03.115 CELLULITIS OF RIGHT LOWER EXTREMITY: ICD-10-CM

## 2022-05-27 DIAGNOSIS — S81.811A SKIN TEAR OF LOWER LEG WITHOUT COMPLICATION, RIGHT, INITIAL ENCOUNTER: Primary | ICD-10-CM

## 2022-05-27 PROCEDURE — 90715 TDAP VACCINE 7 YRS/> IM: CPT | Performed by: STUDENT IN AN ORGANIZED HEALTH CARE EDUCATION/TRAINING PROGRAM

## 2022-05-27 PROCEDURE — 6360000002 HC RX W HCPCS: Performed by: STUDENT IN AN ORGANIZED HEALTH CARE EDUCATION/TRAINING PROGRAM

## 2022-05-27 PROCEDURE — 73590 X-RAY EXAM OF LOWER LEG: CPT

## 2022-05-27 PROCEDURE — 90471 IMMUNIZATION ADMIN: CPT | Performed by: STUDENT IN AN ORGANIZED HEALTH CARE EDUCATION/TRAINING PROGRAM

## 2022-05-27 PROCEDURE — 6370000000 HC RX 637 (ALT 250 FOR IP): Performed by: STUDENT IN AN ORGANIZED HEALTH CARE EDUCATION/TRAINING PROGRAM

## 2022-05-27 PROCEDURE — 99284 EMERGENCY DEPT VISIT MOD MDM: CPT

## 2022-05-27 RX ORDER — CEPHALEXIN 500 MG/1
500 CAPSULE ORAL 3 TIMES DAILY
Qty: 21 CAPSULE | Refills: 0 | Status: SHIPPED | OUTPATIENT
Start: 2022-05-27 | End: 2022-06-03

## 2022-05-27 RX ORDER — CEPHALEXIN 500 MG/1
500 CAPSULE ORAL ONCE
Status: COMPLETED | OUTPATIENT
Start: 2022-05-27 | End: 2022-05-27

## 2022-05-27 RX ADMIN — TETANUS TOXOID, REDUCED DIPHTHERIA TOXOID AND ACELLULAR PERTUSSIS VACCINE, ADSORBED 0.5 ML: 5; 2.5; 8; 8; 2.5 SUSPENSION INTRAMUSCULAR at 12:39

## 2022-05-27 RX ADMIN — CEPHALEXIN 500 MG: 500 CAPSULE ORAL at 12:39

## 2022-05-27 ASSESSMENT — ENCOUNTER SYMPTOMS
PHOTOPHOBIA: 0
NAUSEA: 0
SHORTNESS OF BREATH: 0
COUGH: 0
VOMITING: 0
WHEEZING: 0
ABDOMINAL PAIN: 0

## 2022-05-27 ASSESSMENT — PAIN - FUNCTIONAL ASSESSMENT: PAIN_FUNCTIONAL_ASSESSMENT: NONE - DENIES PAIN

## 2022-05-27 NOTE — ED TRIAGE NOTES
Pt to the ED via EMS from home after transfer injury. Pt has right lower leg skin tear from tranferring from bed to wheel chair. Pt is alert and state that she has no pain.

## 2022-05-27 NOTE — ED NOTES
Pt back to SNF. Pt Discharge papers sent via fax due to not going with EMS.       Tre Sparks RN  05/27/22 [id number]

## 2022-05-27 NOTE — ED PROVIDER NOTES
[address] [facility] ED  EMERGENCY DEPARTMENT ENCOUNTER      Pt Name: Fiorella Gaspar  MRN: [id number]  [place] 7/28/1924  Date of evaluation: 5/27/2022  Provider: Lyly Rico PA-C    CHIEF COMPLAINT       Chief Complaint   Patient presents with    Other     laceration right lower leg (skin tear from transfering)         HISTORY OF PRESENT ILLNESS   (Location/Symptom, Timing/Onset, Context/Setting, Quality, Duration, Modifying Factors, Severity)  Note limiting factors. Fiorella Gaspar is a 80 y.o. female who per chart review has a past medical history of hypertension presents to the emergency department from [facility] for evaluation of right lower extremity skin tear. This occurred when patient was being transferred from bed to wheelchair. There was moderate active bleeding that has since been controlled with pressure. Patient tetanus status unknown. She is not on blood thinners. She denies any pain. They have been dressing the wound at the nursing home however there has been no improvement so they sent her here for evaluation. She denies any fever chills numbness tingling or weakness. HPI    Nursing Notes were reviewed. REVIEW OF SYSTEMS    (2-9 systems for level 4, 10 or more for level 5)     Review of Systems   Constitutional: Negative for chills and fever. HENT: Negative for congestion. Eyes: Negative for photophobia. Respiratory: Negative for cough, shortness of breath and wheezing. Cardiovascular: Negative for chest pain and palpitations. Gastrointestinal: Negative for abdominal pain, nausea and vomiting. Genitourinary: Negative for dysuria, frequency and hematuria. Musculoskeletal: Negative for myalgias. Skin: Positive for wound. Allergic/Immunologic: Negative for immunocompromised state. Neurological: Negative for dizziness, weakness and headaches. All other systems reviewed and are negative.       Except as noted above the remainder of the review of systems was reviewed and negative. PAST MEDICAL HISTORY     Past Medical History:   Diagnosis Date    Back pain     Constipation     Diverticulosis     DVT (deep venous thrombosis) ([facility] [address]     H/O echocardiogram     EF normal    Hearing loss     Heart block AV third degree (HCC)     HTN (hypertension)     Osteoporosis     Pacemaker     Peripheral neuropathy     Pulmonary hypertension (HCC)     Pulmonary nodule, left     Thrombocytopenia (HCC)     Tricuspid regurgitation     Tubular adenoma 2010         SURGICAL HISTORY       Past Surgical History:   Procedure Laterality Date    COLONOSCOPY  1990s    declines further-hx adenoma    HIP FRACTURE SURGERY Right 10/23/2017    TFN NAIL C-ARM RIGHT performed by Aedlina Campbell MD at [facility] REPLACEMENT  12/2017    pin in right hip from previous surgery did not hold, hip replaced   [facility]    right     TOTAL KNEE ARTHROPLASTY  2008    right         CURRENT MEDICATIONS       Discharge Medication List as of 5/27/2022  4:40 PM      CONTINUE these medications which have NOT CHANGED    Details   polyethylene glycol (GLYCOLAX) powder Take 17 g by mouth daily (mix in 8oz of water), Disp-527 g, R-1Normal      spironolactone (ALDACTONE) 25 MG tablet TAKE 1 TABLET DAILY, Disp-90 tablet, R-1Normal      Capsaicin 0.1 % CREA Apply 1 Units topically 2 times daily, Disp-1 Tube, R-2Normal      benzonatate (TESSALON) 100 MG capsule Take 100 mg by mouth 3 times daily as needed for CoughHistorical Med      hydrocortisone (ANUSOL-HC) 2.5 % rectal cream Place rectally 2 times daily. , Disp-1 Tube, R-2, Normal      fluorouracil (EFUDEX) 5 % SOLN external solution Apply topically 2 times daily, Topical, 2 TIMES DAILY Starting Thu 4/19/2018, Disp-1 Bottle, R-0, Normal      Lactobacillus (ACIDOPHILUS PO) Take by mouth 3 times dailyHistorical Med      docusate sodium (COLACE) 100 MG capsule Take 100 mg by mouth every morningHistorical Med      bisacodyl (DULCOLAX) 10 MG suppository Place 10 mg rectally daily as needed for ConstipationHistorical Med      Sodium Phosphates (FLEET ENEMA RE) Place 1 Units rectally daily as needed (constipation)Historical Med      Magnesium Hydroxide (MILK OF MAGNESIA PO) Take 30 mLs by mouth daily as needed (hard stool)Historical Med      melatonin 5 MG TABS tablet Take 5 mg by mouth nightlyHistorical Med             ALLERGIES     Bactrim [sulfamethoxazole-trimethoprim]    FAMILY HISTORY       Family History   Problem Relation Age of Onset    Cancer Mother     Other Father         heart attack. SOCIAL HISTORY       Social History     Socioeconomic History    Marital status:      Spouse name: None    Number of children: None    Years of education: None    Highest education level: None   Occupational History    None   Tobacco Use    Smoking status: Former Smoker     Types: Cigarettes    Smokeless tobacco: Never Used   Vaping Use    Vaping Use: Never used   Substance and Sexual Activity    Alcohol use: No     Alcohol/week: 0.0 standard drinks    Drug use: No    Sexual activity: Never   Other Topics Concern    None   Social History Narrative    Lives alone     Social Determinants of Health     Financial Resource Strain:     Difficulty of Paying Living Expenses: Not on file   Food Insecurity:     Worried About Running Out of Food in the Last Year: Not on file    Caity of Food in the Last Year: Not on file   Transportation Needs:     Lack of Transportation (Medical): Not on file    Lack of Transportation (Non-Medical):  Not on file   Physical Activity:     Days of Exercise per Week: Not on file    Minutes of Exercise per Session: Not on file   Stress:     Feeling of Stress : Not on file   Social Connections:     Frequency of Communication with Friends and Family: Not on file    Frequency of Social Gatherings with Friends and Family: Not on file    Attends Congregation Services: Not on file   [facility] [employer] or Organizations: Not on file    Attends Club or Organization Meetings: Not on file    Marital Status: Not on file   Intimate Partner Violence:     Fear of Current or Ex-Partner: Not on file    Emotionally Abused: Not on file    Physically Abused: Not on file    Sexually Abused: Not on file   Housing Stability:     Unable to Pay for Housing in the Last Year: Not on file    Number of [place] in the Last Year: Not on file    Unstable Housing in the Last Year: Not on file       SCREENINGS         Napoleon Coma Scale  Eye Opening: Spontaneous  Best Verbal Response: Oriented  Best Motor Response: Obeys commands  Napoleon Coma Scale Score: 15                     CIWA Assessment  BP: 110/67  Heart Rate: 65                 PHYSICAL EXAM    (up to 7 for level 4, 8 or more for level 5)     ED Triage Vitals [05/27/22 1055]   BP Temp Temp Source Heart Rate Resp SpO2 Height Weight   110/67 98.6 °F (37 °C) Oral 65 16 96 % 5' 5\" (1.651 m) 126 lb (57.2 kg)       Physical Exam  Constitutional:       General: She is not in acute distress. Appearance: She is well-developed. She is not ill-appearing, toxic-appearing or diaphoretic. HENT:      Head: Normocephalic and atraumatic. Nose: Nose normal.      Mouth/Throat:      Mouth: Mucous membranes are moist.   Eyes:      Pupils: Pupils are equal, round, and reactive to light. Cardiovascular:      Rate and Rhythm: Normal rate and regular rhythm. Heart sounds: No murmur heard. No friction rub. No gallop. Pulmonary:      Effort: Pulmonary effort is normal.      Breath sounds: Normal breath sounds. Abdominal:      General: There is no distension. Tenderness: There is no abdominal tenderness. Musculoskeletal:         General: No swelling. Cervical back: Normal range of motion. Skin:     General: Skin is warm and dry. Comments: Right lower extremity: There is a large 7 cm skin tear to the lateral leg. Some clot formation. Adipose exposure. No foreign body. No tenderness. There is erythema and warmth to the right lower extremity. There is a smaller skin tear the pretibial region. No edema. No point bony tenderness. The right lower extremity is neurovascularly intact. Neurological:      Mental Status: She is alert and oriented to person, place, and time. DIAGNOSTIC RESULTS     EKG: All EKG's are interpreted by the Emergency Department Physician who either signs or Co-signs this chart in the absence of a cardiologist.      RADIOLOGY:   Non-plain film images such as CT, Ultrasound and MRI are read by the radiologist. Plain radiographic images are visualized and preliminarily interpreted by the emergency physician with the below findings:      Interpretation per the Radiologist below, if available at the time of this note:    XR TIBIA FIBULA RIGHT (2 VIEWS)   Final Result   Mild soft tissue swelling, no evidence of acute osseous pathology. ED BEDSIDE ULTRASOUND:   Performed by ED Physician - none    LABS:  Labs Reviewed - No data to display    All other labs were within normal range or not returned as of this dictation. EMERGENCY DEPARTMENT COURSE and DIFFERENTIAL DIAGNOSIS/MDM:   Vitals:    Vitals:    05/27/22 1055   BP: 110/67   Pulse: 65   Resp: 16   Temp: 98.6 °F (37 °C)   TempSrc: Oral   SpO2: 96%   Weight: 126 lb (57.2 kg)   Height: 5' 5\" (1.651 m)     MDM     Patient is a 78-year-old female presents the ED for evaluation of skin tears to the right lower extremity. She is afebrile and hemodynamically stable. On exam it appears patient also has developing cellulitis of the right lower extremity. She was given tetanus booster and 1 dose of p.o. Keflex in the ED. X-ray shows no acute fracture. The skin laceration was repaired with 18 total simple interrupted sutures reinforced with Steri-Strips. Hemostasis was achieved and patient tolerated well.   Covered with nonadherent

## (undated) DEVICE — SHEET,DRAPE,53X77,STERILE: Brand: MEDLINE

## (undated) DEVICE — GOWN,AURORA,NONREINFORCED,LARGE: Brand: MEDLINE

## (undated) DEVICE — GUIDEWIRE ORTH L400MM DIA3.2MM FOR TFN

## (undated) DEVICE — KIT CARE PATIENT HANA PROFX

## (undated) DEVICE — SUTURE VCRL SZ 2-0 L36IN ABSRB UD L36MM CT-1 1/2 CIR J945H

## (undated) DEVICE — GLOVE ORANGE PI 8   MSG9080

## (undated) DEVICE — GLOVE SURG SZ 8 L12IN FNGR THK13MIL BRN LTX SYN POLYMER W

## (undated) DEVICE — PACK,SET UP,DRAPE: Brand: MEDLINE

## (undated) DEVICE — DRAPE TOWEL: Brand: CONVERTORS

## (undated) DEVICE — STAPLER SKIN H3.9MM WIRE DIA0.58MM CRWN 6.9MM 35 STPL FIX

## (undated) DEVICE — 3M™ STERI-DRAPE™ U-DRAPE 1015: Brand: STERI-DRAPE™

## (undated) DEVICE — DRAPE,ISOLATION,INCISE,INVISISHIELD: Brand: MEDLINE

## (undated) DEVICE — INTENDED FOR TISSUE SEPARATION, AND OTHER PROCEDURES THAT REQUIRE A SHARP SURGICAL BLADE TO PUNCTURE OR CUT.: Brand: BARD-PARKER ® CARBON RIB-BACK BLADES

## (undated) DEVICE — SYRINGE BLB 50CC IRRIG PLIABLE FNGR FLNG GRAD FLSK DISP

## (undated) DEVICE — 3M™ COBAN™ STERILE SELF-ADHERENT WRAP, 1584S, 4 IN X 5 YD (10 CM X 4,5 M), 18 ROLLS/CASE: Brand: 3M™ COBAN™

## (undated) DEVICE — 3M™ IOBAN™ 2 ANTIMICROBIAL INCISE DRAPE 6650EZ: Brand: IOBAN™ 2

## (undated) DEVICE — GOWN,PREVENTION PLUS,XLN/XL,ST,24/CS: Brand: MEDLINE

## (undated) DEVICE — PAD,ABDOMINAL,8"X10",ST,LF: Brand: MEDLINE

## (undated) DEVICE — SKIN MARKER,REGULAR TIP WITH RULER: Brand: DEVON

## (undated) DEVICE — CHLORAPREP 26ML ORANGE

## (undated) DEVICE — 1842 FOAM BLOCK NEEDLE COUNTER: Brand: DEVON

## (undated) DEVICE — SUTURE VCRL SZ 0 L36IN ABSRB UD L36MM CT-1 1/2 CIR J946H

## (undated) DEVICE — PENCIL ES L3M BTTN SWCH HOLSTER W/ BLDE ELECTRD EDGE

## (undated) DEVICE — DRAPE SURG C-ARM MOBILE XRAY LF

## (undated) DEVICE — ELECTRODE PT RET AD L9FT HI MOIST COND ADH HYDRGEL CORDED

## (undated) DEVICE — CONVERTED USE 291618 SPONGE LAPAROTOMY POCKET POUCH RING 18

## (undated) DEVICE — BIT DRL L330MM DIA4.2MM CALIB 100MM 3 FLUT QUIK CPL

## (undated) DEVICE — DRESSING GZ W1XL8IN COT XRFRM N ADH OVERWRAP CURAD

## (undated) DEVICE — GAUZE SPONGES,12 PLY: Brand: CURITY

## (undated) DEVICE — Device: Brand: BOOT LINER, DISPOSABLE

## (undated) DEVICE — LABEL MED MED CHPOR